# Patient Record
Sex: FEMALE | Race: WHITE | NOT HISPANIC OR LATINO | Employment: FULL TIME | URBAN - METROPOLITAN AREA
[De-identification: names, ages, dates, MRNs, and addresses within clinical notes are randomized per-mention and may not be internally consistent; named-entity substitution may affect disease eponyms.]

---

## 2018-08-16 ENCOUNTER — TRANSCRIBE ORDERS (OUTPATIENT)
Dept: ADMINISTRATIVE | Facility: HOSPITAL | Age: 23
End: 2018-08-16

## 2018-08-16 DIAGNOSIS — N92.1 BREAKTHROUGH BLEEDING ON BIRTH CONTROL PILLS: Primary | ICD-10-CM

## 2018-08-16 PROCEDURE — 87491 CHLMYD TRACH DNA AMP PROBE: CPT | Performed by: OBSTETRICS & GYNECOLOGY

## 2018-08-16 PROCEDURE — G0124 SCREEN C/V THIN LAYER BY MD: HCPCS | Performed by: PATHOLOGY

## 2018-08-16 PROCEDURE — 87591 N.GONORRHOEAE DNA AMP PROB: CPT | Performed by: OBSTETRICS & GYNECOLOGY

## 2018-08-16 PROCEDURE — G0145 SCR C/V CYTO,THINLAYER,RESCR: HCPCS | Performed by: PATHOLOGY

## 2018-08-17 ENCOUNTER — LAB (OUTPATIENT)
Dept: LAB | Facility: HOSPITAL | Age: 23
End: 2018-08-17
Payer: COMMERCIAL

## 2018-08-17 ENCOUNTER — HOSPITAL ENCOUNTER (OUTPATIENT)
Dept: ULTRASOUND IMAGING | Facility: HOSPITAL | Age: 23
Discharge: HOME | End: 2018-08-17
Payer: COMMERCIAL

## 2018-08-17 ENCOUNTER — TELEPHONE (OUTPATIENT)
Dept: GYNECOLOGY | Facility: CLINIC | Age: 23
End: 2018-08-17

## 2018-08-17 DIAGNOSIS — N92.1 BREAKTHROUGH BLEEDING ON BIRTH CONTROL PILLS: ICD-10-CM

## 2018-08-17 LAB
ERYTHROCYTE [DISTWIDTH] IN BLOOD BY AUTOMATED COUNT: 13.5 % (ref 11.6–15.1)
HCT VFR BLD AUTO: 41.6 % (ref 34.8–46.1)
HGB BLD-MCNC: 13.4 G/DL (ref 11.5–15.4)
MCH RBC QN AUTO: 28.9 PG (ref 26.8–34.3)
MCHC RBC AUTO-ENTMCNC: 32.2 G/DL (ref 31.4–37.4)
MCV RBC AUTO: 90 FL (ref 82–98)
PLATELET # BLD AUTO: 262 THOUSANDS/UL (ref 149–390)
PMV BLD AUTO: 10.3 FL (ref 8.9–12.7)
RBC # BLD AUTO: 4.63 MILLION/UL (ref 3.81–5.12)
WBC # BLD AUTO: 6.84 THOUSAND/UL (ref 4.31–10.16)

## 2018-08-17 PROCEDURE — 36415 COLL VENOUS BLD VENIPUNCTURE: CPT

## 2018-08-17 PROCEDURE — 85027 COMPLETE CBC AUTOMATED: CPT

## 2018-08-17 PROCEDURE — 76830 TRANSVAGINAL US NON-OB: CPT

## 2018-08-17 PROCEDURE — 76856 US EXAM PELVIC COMPLETE: CPT

## 2018-08-22 ENCOUNTER — TELEPHONE (OUTPATIENT)
Dept: GYNECOLOGY | Facility: CLINIC | Age: 23
End: 2018-08-22

## 2018-08-22 DIAGNOSIS — N83.201 OVARIAN CYST, RIGHT: Primary | ICD-10-CM

## 2018-08-22 NOTE — PROGRESS NOTES
Pt moms, Sarai Hernandez, aware of results will pass on information to Una Fernandes, Mailed rx to pt for her 6 week follow up u/s

## 2018-08-22 NOTE — TELEPHONE ENCOUNTER
Moms phone number is [phone], Per Twin Eugenio can give her mother the results for her ultrasound and pap smear

## 2018-08-27 ENCOUNTER — CLINICAL SUPPORT (OUTPATIENT)
Dept: FAMILY MEDICINE CLINIC | Facility: CLINIC | Age: 23
End: 2018-08-27
Payer: COMMERCIAL

## 2018-08-27 DIAGNOSIS — Z13.79 GENETIC TESTING: Primary | ICD-10-CM

## 2018-08-27 PROCEDURE — 99211 OFF/OP EST MAY X REQ PHY/QHP: CPT

## 2018-09-05 ENCOUNTER — ANNUAL EXAM (OUTPATIENT)
Dept: GYNECOLOGY | Facility: CLINIC | Age: 23
End: 2018-09-05
Payer: COMMERCIAL

## 2018-09-05 VITALS
RESPIRATION RATE: 15 BRPM | WEIGHT: 127 LBS | SYSTOLIC BLOOD PRESSURE: 100 MMHG | BODY MASS INDEX: 23.37 KG/M2 | HEIGHT: 62 IN | DIASTOLIC BLOOD PRESSURE: 62 MMHG

## 2018-09-05 DIAGNOSIS — N89.8 VAGINAL LESION: Primary | ICD-10-CM

## 2018-09-05 DIAGNOSIS — N76.0 VAGINITIS AND VULVOVAGINITIS: ICD-10-CM

## 2018-09-05 PROCEDURE — 87660 TRICHOMONAS VAGIN DIR PROBE: CPT | Performed by: OBSTETRICS & GYNECOLOGY

## 2018-09-05 PROCEDURE — 87255 GENET VIRUS ISOLATE HSV: CPT | Performed by: OBSTETRICS & GYNECOLOGY

## 2018-09-05 PROCEDURE — 99213 OFFICE O/P EST LOW 20 MIN: CPT | Performed by: OBSTETRICS & GYNECOLOGY

## 2018-09-05 PROCEDURE — 87510 GARDNER VAG DNA DIR PROBE: CPT | Performed by: OBSTETRICS & GYNECOLOGY

## 2018-09-05 PROCEDURE — 87480 CANDIDA DNA DIR PROBE: CPT | Performed by: OBSTETRICS & GYNECOLOGY

## 2018-09-05 RX ORDER — CLINDAMYCIN PHOSPHATE 20 MG/G
CREAM VAGINAL
COMMUNITY
Start: 2018-09-04 | End: 2018-12-27

## 2018-09-05 NOTE — PROGRESS NOTES
Assessment/Plan:    1   Vulvovaginitis -- most likely candida  Will take fluconazole and use monistat for maximum effect  If she develops any pain or ulceration, she will call  Diagnoses and all orders for this visit:    Vaginitis and vulvovaginitis  -     VAGINOSIS DNA PROBE (AFFIRM)    Other orders  -     clindamycin (CLEOCIN) 2 % vaginal cream;           Subjective:      Patient ID: Tr Camera is a 21 y o  female  The patient is a 22 y/o who presents with a 2 day history of vulvar itching and burning  She spent 3 days at the beach, most of the time in a bathing suit  The itching and burning started on the last day  She also has some odor and thought it may be BV, so she used some cleocin vaginal cream last night  It provided no relief  Today she also feels as if there is a "cut" on the inner labia minora  She had intercourse with a new partner this weekend and did use a condom  The following portions of the patient's history were reviewed and updated as appropriate: allergies, current medications, past family history, past medical history, past social history, past surgical history and problem list     Review of Systems   Constitutional: Negative  Respiratory: Negative for shortness of breath  Cardiovascular: Negative for chest pain  Gastrointestinal: Negative  Genitourinary: Positive for vaginal discharge  Skin: Negative  Psychiatric/Behavioral: Negative for agitation, behavioral problems and confusion  Objective:      /62 (Cuff Size: Adult)   Resp 15   Ht 5' 2" (1 575 m)   Wt 57 6 kg (127 lb)   BMI 23 23 kg/m²          Physical Exam   Constitutional: She appears well-developed and well-nourished  No distress  HENT:   Head: Normocephalic  Pulmonary/Chest: Effort normal  No respiratory distress  Genitourinary: No labial fusion  There is no rash, tenderness, lesion or injury on the right labia   There is no rash, tenderness, lesion or injury on the left labia  No erythema, tenderness or bleeding in the vagina  No foreign body in the vagina  No signs of injury around the vagina  Vaginal discharge found  Genitourinary Comments: Inspection of the labia shows mild edema and moderate erythema of the labia min and majora  There is a very superficial laceration of the inner lab min on the left  No ulcers  Vaginal d/c is pink to white, clumpy  The cervix is clean with no cervical motion tenderness  Uterus normal size, retroverted, nontender  The adnexa are nontender and are normal size  Skin: Skin is warm, dry and intact  She is not diaphoretic  No cyanosis  Nails show no clubbing  Psychiatric: She has a normal mood and affect   Her speech is normal and behavior is normal

## 2018-09-07 DIAGNOSIS — B37.3 MONILIAL VAGINITIS: Primary | ICD-10-CM

## 2018-09-07 LAB
CANDIDA RRNA VAG QL PROBE: POSITIVE
G VAGINALIS RRNA GENITAL QL PROBE: NEGATIVE
HSV SPEC CULT: NORMAL
T VAGINALIS RRNA GENITAL QL PROBE: NEGATIVE

## 2018-09-07 RX ORDER — FLUCONAZOLE 150 MG/1
150 TABLET ORAL ONCE
Qty: 1 TABLET | Refills: 3 | Status: SHIPPED | OUTPATIENT
Start: 2018-09-07 | End: 2018-09-07

## 2018-09-12 ENCOUNTER — TELEPHONE (OUTPATIENT)
Dept: GYNECOLOGY | Facility: CLINIC | Age: 23
End: 2018-09-12

## 2018-09-12 NOTE — TELEPHONE ENCOUNTER
[id number] Shonna Lucero, tell her she can just stop  She should expect more of a period a couple of days after she stops  Tell her that her genetic results are in and she should come in to discuss

## 2018-09-13 ENCOUNTER — TELEPHONE (OUTPATIENT)
Dept: GYNECOLOGY | Facility: CLINIC | Age: 23
End: 2018-09-13

## 2018-09-13 ENCOUNTER — ANNUAL EXAM (OUTPATIENT)
Dept: GYNECOLOGY | Facility: CLINIC | Age: 23
End: 2018-09-13
Payer: COMMERCIAL

## 2018-09-13 VITALS
TEMPERATURE: 97.6 F | BODY MASS INDEX: 23.05 KG/M2 | DIASTOLIC BLOOD PRESSURE: 70 MMHG | RESPIRATION RATE: 16 BRPM | WEIGHT: 126 LBS | HEART RATE: 73 BPM | SYSTOLIC BLOOD PRESSURE: 120 MMHG

## 2018-09-13 DIAGNOSIS — B37.3 MONILIAL VAGINITIS: ICD-10-CM

## 2018-09-13 DIAGNOSIS — Z15.09 GENETIC SUSCEPTIBILITY TO CANCER: Primary | ICD-10-CM

## 2018-09-13 PROCEDURE — 99213 OFFICE O/P EST LOW 20 MIN: CPT | Performed by: OBSTETRICS & GYNECOLOGY

## 2018-09-13 RX ORDER — FLUCONAZOLE 150 MG/1
150 TABLET ORAL ONCE
Qty: 1 TABLET | Refills: 3 | Status: SHIPPED | OUTPATIENT
Start: 2018-09-13 | End: 2018-09-13

## 2018-09-13 NOTE — PROGRESS NOTES
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    Monilial vaginitis  -     fluconazole (DIFLUCAN) 150 mg tablet; Take 1 tablet (150 mg total) by mouth once for 1 dose          Subjective:      Patient ID: Jonah Miller is a 21 y o  female  The patient is a 24-year-old with a family history of breast and ovarian cancer  Her mother and her brother have had genetic testing and are positive for the BRCA 2 mutation  The patient had 10 testing about 3 weeks ago and is here for results today  The patient was notified that she is also positive for the BRCA 2 mutation  She was given a copy of the [facility] results for her own records  She is aware that she will need to start mammography is at age 22  Her results also showed that she was at increased risk for melanoma and pancreatic cancer  She has an appointment with a dermatologist already set up  We discussed possible methods of screening for pancreatic cancer, although the best test is yet to be determined  She will look into any potential clinical trials being performed at [facility] or [facility] to help determine the best method of pancreatic screening  Multiple questions were answered  The patient reports that although she has used the Monistat and fluconazole for a yeast infection about 3 weeks ago, she feels that the yeast infection may be coming back  She will get a refill of fluconazole and take this  If she is still not better, she will need to come back for re-culture  The following portions of the patient's history were reviewed and updated as appropriate: allergies, current medications, past family history, past medical history, past social history, past surgical history and problem list     Review of Systems   Constitutional: Negative  Respiratory: Negative for shortness of breath  Cardiovascular: Negative for chest pain  Gastrointestinal: Negative      Genitourinary: Negative  Skin: Negative  Psychiatric/Behavioral: Negative for agitation, behavioral problems and confusion  Objective:      /70 (BP Location: Right arm, Patient Position: Sitting, Cuff Size: Standard)   Pulse 73   Temp 97 6 °F (36 4 °C) (Tympanic)   Resp 16   Wt 57 2 kg (126 lb)   LMP 09/04/2018 (Exact Date)   Breastfeeding? No   BMI 23 05 kg/m²          Physical Exam   Constitutional: She is oriented to person, place, and time  She appears well-developed and well-nourished  No distress  HENT:   Head: Normocephalic and atraumatic  Pulmonary/Chest: Effort normal  No respiratory distress  Neurological: She is alert and oriented to person, place, and time  Skin: Skin is warm and dry  No rash noted  She is not diaphoretic  No erythema  No pallor  Psychiatric: She has a normal mood and affect   Her behavior is normal  Judgment and thought content normal

## 2018-09-13 NOTE — TELEPHONE ENCOUNTER
lmom for pt to pls call the office  Pt needs to reschedule when Liz Patient is here she is having a BRCA testing done

## 2018-10-03 ENCOUNTER — HOSPITAL ENCOUNTER (OUTPATIENT)
Dept: ULTRASOUND IMAGING | Facility: HOSPITAL | Age: 23
Discharge: HOME | End: 2018-10-03
Payer: COMMERCIAL

## 2018-10-03 ENCOUNTER — TELEPHONE (OUTPATIENT)
Dept: GYNECOLOGY | Facility: CLINIC | Age: 23
End: 2018-10-03

## 2018-10-03 DIAGNOSIS — N83.201 OVARIAN CYST, RIGHT: ICD-10-CM

## 2018-10-03 PROCEDURE — 76830 TRANSVAGINAL US NON-OB: CPT

## 2018-10-03 PROCEDURE — 76856 US EXAM PELVIC COMPLETE: CPT

## 2018-12-27 ENCOUNTER — TELEPHONE (OUTPATIENT)
Dept: FAMILY MEDICINE CLINIC | Facility: CLINIC | Age: 23
End: 2018-12-27

## 2018-12-27 DIAGNOSIS — B96.89 BACTERIAL VAGINOSIS: Primary | ICD-10-CM

## 2018-12-27 DIAGNOSIS — N76.0 BACTERIAL VAGINOSIS: Primary | ICD-10-CM

## 2018-12-27 RX ORDER — METRONIDAZOLE 7.5 MG/G
1 GEL VAGINAL
Qty: 70 G | Refills: 0 | Status: SHIPPED | OUTPATIENT
Start: 2018-12-27 | End: 2019-01-01

## 2018-12-27 NOTE — TELEPHONE ENCOUNTER
Patient has symptoms of BD vaginitis  Patient said it was following using a latex condom  Patient believes she has a allergy to latex  She would like to know what you recommend for her  She said she is willing to come in but her schedule is tough   Please advise

## 2018-12-31 ENCOUNTER — APPOINTMENT (OUTPATIENT)
Dept: LAB | Age: 23
End: 2018-12-31
Payer: COMMERCIAL

## 2018-12-31 ENCOUNTER — TRANSCRIBE ORDERS (OUTPATIENT)
Dept: ADMINISTRATIVE | Age: 23
End: 2018-12-31

## 2018-12-31 DIAGNOSIS — L27.2 DERMATITIS DUE TO FOOD TAKEN INTERNALLY: Primary | ICD-10-CM

## 2018-12-31 DIAGNOSIS — L27.2 DERMATITIS DUE TO FOOD TAKEN INTERNALLY: ICD-10-CM

## 2018-12-31 PROCEDURE — 36415 COLL VENOUS BLD VENIPUNCTURE: CPT

## 2018-12-31 PROCEDURE — 83516 IMMUNOASSAY NONANTIBODY: CPT

## 2018-12-31 PROCEDURE — 86255 FLUORESCENT ANTIBODY SCREEN: CPT

## 2018-12-31 PROCEDURE — 82784 ASSAY IGA/IGD/IGG/IGM EACH: CPT

## 2019-01-02 LAB
ENDOMYSIUM IGA SER QL: NEGATIVE
GLIADIN PEPTIDE IGA SER-ACNC: 4 UNITS (ref 0–19)
GLIADIN PEPTIDE IGG SER-ACNC: 3 UNITS (ref 0–19)
IGA SERPL-MCNC: 180 MG/DL (ref 87–352)
TTG IGA SER-ACNC: <2 U/ML (ref 0–3)
TTG IGG SER-ACNC: <2 U/ML (ref 0–5)

## 2019-03-21 PROBLEM — L24.7 IRRITANT CONTACT DERMATITIS DUE TO PLANT: Status: ACTIVE | Noted: 2019-03-21

## 2019-03-23 PROBLEM — H10.13 ALLERGIC CONJUNCTIVITIS OF BOTH EYES: Status: ACTIVE | Noted: 2019-03-23

## 2019-03-23 PROBLEM — T78.3XXA ANGIO-EDEMA: Status: ACTIVE | Noted: 2019-03-23

## 2019-03-23 PROBLEM — J30.81 ALLERGIC RHINITIS DUE TO ANIMAL (CAT) (DOG) HAIR AND DANDER: Status: ACTIVE | Noted: 2019-03-23

## 2019-03-23 PROBLEM — J45.20 ALLERGIC ASTHMA, MILD INTERMITTENT, UNCOMPLICATED: Status: ACTIVE | Noted: 2019-03-23

## 2019-03-23 PROBLEM — T78.05XA ANAPHYLAXIS DUE TO TREE NUTS OR SEEDS: Status: ACTIVE | Noted: 2019-03-23

## 2019-03-23 PROBLEM — T78.1XXA PFAS (POLLEN-FOOD ALLERGY SYNDROME), INITIAL ENCOUNTER: Status: ACTIVE | Noted: 2019-03-23

## 2019-03-23 PROBLEM — L20.84 INTRINSIC ECZEMA: Status: ACTIVE | Noted: 2019-03-23

## 2019-03-23 PROBLEM — J30.89 ALLERGIC RHINITIS CAUSED BY MOLD: Status: ACTIVE | Noted: 2019-03-23

## 2019-03-23 PROBLEM — L24.7 IRRITANT CONTACT DERMATITIS DUE TO PLANTS, EXCEPT FOOD: Status: ACTIVE | Noted: 2019-03-23

## 2019-03-23 PROBLEM — L25.3 LATEX ALLERGY, CONTACT DERMATITIS: Status: ACTIVE | Noted: 2019-03-23

## 2019-03-23 PROBLEM — J30.1 SEASONAL ALLERGIC RHINITIS DUE TO POLLEN: Status: ACTIVE | Noted: 2019-03-23

## 2019-08-28 ENCOUNTER — ANNUAL EXAM (OUTPATIENT)
Dept: GYNECOLOGY | Facility: CLINIC | Age: 24
End: 2019-08-28
Payer: COMMERCIAL

## 2019-08-28 VITALS
RESPIRATION RATE: 17 BRPM | SYSTOLIC BLOOD PRESSURE: 102 MMHG | BODY MASS INDEX: 24.4 KG/M2 | HEART RATE: 79 BPM | WEIGHT: 132.6 LBS | HEIGHT: 62 IN | DIASTOLIC BLOOD PRESSURE: 60 MMHG

## 2019-08-28 DIAGNOSIS — Z11.3 ENCOUNTER FOR SCREENING FOR INFECTIONS WITH A PREDOMINANTLY SEXUAL MODE OF TRANSMISSION: ICD-10-CM

## 2019-08-28 DIAGNOSIS — Z01.419 ENCOUNTER FOR GYNECOLOGICAL EXAMINATION (GENERAL) (ROUTINE) WITHOUT ABNORMAL FINDINGS: Primary | ICD-10-CM

## 2019-08-28 DIAGNOSIS — Z15.09 GENETIC SUSCEPTIBILITY TO CANCER: ICD-10-CM

## 2019-08-28 DIAGNOSIS — R10.2 PELVIC PAIN: ICD-10-CM

## 2019-08-28 PROCEDURE — 87491 CHLMYD TRACH DNA AMP PROBE: CPT | Performed by: OBSTETRICS & GYNECOLOGY

## 2019-08-28 PROCEDURE — S0612 ANNUAL GYNECOLOGICAL EXAMINA: HCPCS | Performed by: OBSTETRICS & GYNECOLOGY

## 2019-08-28 PROCEDURE — 87591 N.GONORRHOEAE DNA AMP PROB: CPT | Performed by: OBSTETRICS & GYNECOLOGY

## 2019-08-28 NOTE — PROGRESS NOTES
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  Comments:  BRCA 2 positive  Orders:  -     US pelvis complete w transvaginal; Future          Subjective:      Patient ID: Keyana Cote is a 25 y o  female  The patient is a 26-year-old who presents for an annual gyn exam   She reports that her menses are regular and last for 5 days  She has 2 heavier days when she also experiences cramping  They they are no heavier than they were in the past on birth control pills  She stopped oral contraceptives because of breakthrough bleeding  She is not interested in restarting  She states that her migraines have stopped since she is off the oral contraceptives  The patient denies any breast or bladder problems  She has no dyspareunia  Her boyfriend lives in [place] temporarily  They do not see each other often, but she uses condoms consistently with him  She has no other partners  The patient is positive for BRCA 2  Both her brother and her mother are also positive  She is aware that she will need to have mammography starting next year  She has thought about prophylactic bilateral mastectomy and she wishes to proceed with this surgery  She is being referred to breast surgeons to discuss this in more detail with them  The patient had a normal Pap smear last year  Her GC and chlamydia were negative  This year she will have only the GC and chlamydia testing  The patient reports that she has had a GI workup this year because of frequent problems with abdominal and pelvic pain  She also feels very bloated  Because of her BRCA 2 positivity combined with the bloating and pain, she will have an ultrasound of the pelvis    The patient had her pancreas evaluated by the GI doctor, who was aware of her be BRCA status  The following portions of the patient's history were reviewed and updated as appropriate: allergies, current medications, past family history, past medical history, past social history, past surgical history and problem list     Review of Systems   Constitutional: Negative  Respiratory: Negative for shortness of breath  Cardiovascular: Negative for chest pain  Gastrointestinal: Positive for abdominal distention and abdominal pain  Genitourinary: Positive for pelvic pain  Negative for difficulty urinating, dyspareunia, frequency, genital sores, menstrual problem, vaginal discharge and vaginal pain  Skin: Negative  Psychiatric/Behavioral: Negative for agitation, behavioral problems and confusion  Objective:      /60 (BP Location: Left arm, Patient Position: Sitting, Cuff Size: Standard)   Pulse 79   Resp 17   Ht 5' 2" (1 575 m)   Wt 60 1 kg (132 lb 9 6 oz)   LMP 08/07/2019 (Exact Date)   BMI 24 25 kg/m²          Physical Exam   Constitutional: She appears well-developed and well-nourished  No distress  HENT:   Head: Normocephalic  Pulmonary/Chest: Effort normal  No respiratory distress  Right breast exhibits no inverted nipple, no mass, no nipple discharge, no skin change and no tenderness  Left breast exhibits no inverted nipple, no mass, no nipple discharge, no skin change and no tenderness  Breasts are symmetrical    Abdominal: She exhibits no distension and no mass  There is no tenderness  There is no rebound and no guarding  Genitourinary: Uterus normal  No labial fusion  There is no rash, tenderness, lesion or injury on the right labia  There is no rash, tenderness, lesion or injury on the left labia  Uterus is not tender  Cervix exhibits no motion tenderness, no discharge and no friability  Right adnexum displays no mass, no tenderness and no fullness  Left adnexum displays no mass, no tenderness and no fullness   No erythema, tenderness or bleeding in the vagina  No foreign body in the vagina  No signs of injury around the vagina  No vaginal discharge found  Lymphadenopathy:        Right axillary: No pectoral and no lateral adenopathy present  Left axillary: No pectoral and no lateral adenopathy present  Skin: Skin is warm, dry and intact  She is not diaphoretic  No cyanosis  Nails show no clubbing  Psychiatric: She has a normal mood and affect   Her speech is normal and behavior is normal

## 2019-08-29 LAB
C TRACH DNA SPEC QL NAA+PROBE: NEGATIVE
N GONORRHOEA DNA SPEC QL NAA+PROBE: NEGATIVE

## 2019-09-11 ENCOUNTER — TELEPHONE (OUTPATIENT)
Dept: FAMILY MEDICINE CLINIC | Facility: CLINIC | Age: 24
End: 2019-09-11

## 2019-09-11 NOTE — TELEPHONE ENCOUNTER
----- Message from Charlotte Pedraza MD sent at 9/4/2019  3:42 PM EDT -----  Notify patient that her GC and Chlamydia are negative

## 2019-09-20 ENCOUNTER — TELEPHONE (OUTPATIENT)
Dept: HEMATOLOGY ONCOLOGY | Facility: CLINIC | Age: 24
End: 2019-09-20

## 2019-09-20 NOTE — TELEPHONE ENCOUNTER
New Patient Encounter    New Patient Intake Form   Patient Details:  Kary Arcos  1995  [id number]    Background Information:  [address] starts by opening a telephone encounter and gathering the following information   Who is calling to schedule? If not self, relationship to patient? self   Referring Provider Dr Gautam Hayes   What is the diagnosis? BRCA +   When was the diagnosis? 08/27/2018   Is patient aware of diagnosis? Yes   Reason for visit? NP DX   Have you had any testing done? If so: when, where? Yes   Are records in [facility]? yes   Was the patient told to bring a disk? no   Scheduling Information:   Preferred [place]: Jacek Estrada     Requesting Specific Provider? sabrina   Are there any dates/time the patient cannot be seen? no   Counseling Pre-Screen:  If the patient answers YES to any of the below questions, please route to the appropriate location specific counselor    Have you felt anxious or worried about cancer and the treatment you are receiving? No   Has your diagnosis caused physical, emotional, or financial hardship for you? No   Note: Do not ask the patient about transportation issues/needs  Please notate if the patient brings it up and the counselor will schedule accordingly  Miscellaneous: na   After completing the above information, please route to Financial Counselor and the appropriate Nurse Navigator for review

## 2019-09-23 ENCOUNTER — HOSPITAL ENCOUNTER (OUTPATIENT)
Dept: ULTRASOUND IMAGING | Facility: HOSPITAL | Age: 24
Discharge: HOME | End: 2019-09-23
Payer: COMMERCIAL

## 2019-09-23 DIAGNOSIS — R10.2 PELVIC PAIN: ICD-10-CM

## 2019-09-23 DIAGNOSIS — Z15.09 GENETIC SUSCEPTIBILITY TO CANCER: ICD-10-CM

## 2019-09-23 PROCEDURE — 76856 US EXAM PELVIC COMPLETE: CPT

## 2019-09-23 PROCEDURE — 76830 TRANSVAGINAL US NON-OB: CPT

## 2019-09-26 ENCOUNTER — TELEPHONE (OUTPATIENT)
Dept: GYNECOLOGY | Facility: CLINIC | Age: 24
End: 2019-09-26

## 2019-09-26 DIAGNOSIS — N83.202 OVARIAN CYST, LEFT: Primary | ICD-10-CM

## 2019-09-26 NOTE — TELEPHONE ENCOUNTER
Radiology called and left a message stating that there was significant findings on the pelvic ultrasound the patient recently had done

## 2019-09-27 NOTE — TELEPHONE ENCOUNTER
Called patient with results of u/s pelvis  She will schedule a repeat u/s in 6 weeks for resolution  She reports that she feels premenstrual now  This would go along with the finding of a possible corpus luteum on u/s

## 2019-09-30 PROBLEM — Z15.09 BRCA2 GENE MUTATION POSITIVE: Status: ACTIVE | Noted: 2019-09-30

## 2019-09-30 PROBLEM — Z15.01 BRCA2 GENE MUTATION POSITIVE: Status: ACTIVE | Noted: 2019-09-30

## 2019-10-02 ENCOUNTER — CONSULT (OUTPATIENT)
Dept: SURGICAL ONCOLOGY | Facility: CLINIC | Age: 24
End: 2019-10-02
Payer: COMMERCIAL

## 2019-10-02 VITALS
WEIGHT: 132 LBS | DIASTOLIC BLOOD PRESSURE: 64 MMHG | TEMPERATURE: 98.5 F | BODY MASS INDEX: 24.29 KG/M2 | HEIGHT: 62 IN | HEART RATE: 64 BPM | SYSTOLIC BLOOD PRESSURE: 114 MMHG | RESPIRATION RATE: 16 BRPM

## 2019-10-02 DIAGNOSIS — Z84.81 FAMILY HISTORY OF BRCA GENE POSITIVE: ICD-10-CM

## 2019-10-02 DIAGNOSIS — Z15.01 BRCA2 GENE MUTATION POSITIVE: Primary | ICD-10-CM

## 2019-10-02 DIAGNOSIS — Z15.09 BRCA2 GENE MUTATION POSITIVE: Primary | ICD-10-CM

## 2019-10-02 PROCEDURE — 99243 OFF/OP CNSLTJ NEW/EST LOW 30: CPT | Performed by: SURGERY

## 2019-10-02 RX ORDER — HYOSCYAMINE SULFATE 0.125 MG
0.12 TABLET ORAL EVERY 4 HOURS PRN
COMMUNITY
End: 2020-01-14

## 2019-10-02 NOTE — PROGRESS NOTES
Surgical Oncology Consult Note       [address] [facility],[address]  [facility]  [address] [facility] [address]    Cristina Davidjuan ramon  1995  [id number]  [address]  [facility]  [address] [address]      Chief Complaint:     Chief Complaint   Patient presents with    Consult     BRCA 2 positive       Assessment and Plan:   Assessment/Plan     Patient presents with the a known/ documented BRCA 2 mutation  She is interested in bilateral prophylactic surgery  She has discussed this with her family as well as colleagues who have undergone the surgery  Literature looking at the expected benefit a preventative mastectomy on cancer incidence and mortality and BRCA mutations has been studied in improved  Outcomes continue to increased down to age 22 (Breast Cancer Res Treat 2018; 270:839-781)  She has no evidence of malignancy on clinical examination today  We had a long conversation regarding the various types of surgeries  I have recommended she see a plastic surgeon as a consult  I will see her back in 3 months  I would recommend a breast MRI prior to surgery  All questions were answered the patient's satisfaction  We also showed her pictures of nipple tattoos which she was interested in  Oncology History:      No history exists  History of Present Illness: The patient's paternal grandmother was diagnosed with ovarian cancer at the age of 48  Her mother had a bilateral prophylactic mastectomy and has done well  The patient has been tested in has a deleterious mutation of BRCA 2  She presents now for an opinion regarding further management  She is discussed this at length with her gynecologist who was following her from an ovarian perspective  Review of Systems:   Review of Systems   All other systems reviewed and are negative        Past Medical History:      Patient Active Problem List   Diagnosis    Irritant contact dermatitis due to plant    Irritant contact dermatitis due to plants, except food    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    Angio-edema    Allergic asthma, mild intermittent, uncomplicated    Allergic rhinitis caused by mold    Seasonal allergic rhinitis due to pollen    Allergic rhinitis due to animal (cat) (dog) hair and dander    Allergic conjunctivitis of both eyes    Intrinsic eczema    PFAS (pollen-food allergy syndrome), initial encounter    Latex allergy, contact dermatitis    BRCA2 gene mutation positive        Past Medical History:   Diagnosis Date    Anaphylaxis     tree nuts     BRCA positive     Cervical stenosis     Cervical stenosis (uterine cervix)     Dysmenorrhea     Hives     Inguinal mass     right    Menorrhagia     Pap smear for cervical cancer screening 05/31/2017    ASCUS, HPV negative    Vitamin D deficiency         Past Surgical History:   Procedure Laterality Date    KNEE CARTILAGE SURGERY      LYMPHADENECTOMY Bilateral     MASS EXCISION Right     inguinal, possible folliculitis    WISDOM TOOTH EXTRACTION Bilateral         Family History   Problem Relation Age of Onset    Ovarian cancer Maternal Grandmother 48    Brain cancer Maternal Grandmother     BRCA 1/2 Mother         Prophylactic surgery    Hypertension Father     Heart disease Father     BRCA 1/2 Brother     Allergies Brother     BRCA 1/2 Maternal Uncle         Social History     Socioeconomic History    Marital status: Single     Spouse name: Not on file    Number of children: 0    Years of education: Not on file    Highest education level: Not on file   Occupational History    Occupation: Sales      Comment: [employer]    Social Needs    Financial resource strain: Not on file    Food insecurity:     Worry: Not on file     Inability: Not on file    Transportation needs:     Medical: Not on file     Non-medical: Not on file   Tobacco Use    Smoking status: Never Smoker    Smokeless tobacco: Never Used   Substance and Sexual Activity    Alcohol use:  Yes     Alcohol/week: 4 0 standard drinks     Types: 4 Standard drinks or equivalent per week    Drug use: No    Sexual activity: Yes   Lifestyle    Physical activity:     Days per week: 7 days     Minutes per session: 120 min    Stress: Not on file   Relationships    Social connections:     Talks on phone: Not on file     Gets together: Not on file     Attends Orthodox service: Not on file     Active member of club or organization: Not on file     Attends meetings of clubs or organizations: Not on file     Relationship status: Not on file    Intimate partner violence:     Fear of current or ex partner: Not on file     Emotionally abused: Not on file     Physically abused: Not on file     Forced sexual activity: Not on file   Other Topics Concern    Not on file   Social History Narrative    Patient lives in a single dwelling home     [place] [employer] genes[employer] in the bedroom     Home is smoke free     Uses air      No humidifier     No dehumidifier     No basement     Window air conditioning         2 dogs  Dilly and Morales and there not allowed in the bedroom         Caffeine:  Does not consume     Chocolate occasional         Patient lives with mom and dad         Current Outpatient Medications:     albuterol (VENTOLIN HFA) 90 mcg/act inhaler, Inhale 2 puffs every 6 (six) hours as needed for wheezing, Disp: 18 g, Rfl: 3    EPINEPHrine (EPIPEN) 0 3 mg/0 3 mL SOAJ, Inject 0 3 mg into a muscle as needed for anaphylaxis, Disp: , Rfl: 1    hyoscyamine (ANASPAZ,LEVSIN) 0 125 MG tablet, Take 0 125 mg by mouth every 4 (four) hours as needed for cramping, Disp: , Rfl:     Wheat Dextrin (BENEFIBER PO), Take by mouth daily, Disp: , Rfl:     EPINEPHrine (EPIPEN) 0 3 mg/0 3 mL SOAJ, Inject 0 3 mL (0 3 mg total) into a muscle once for 1 dose, Disp: 1 each, Rfl: 11    mometasone (ELOCON) 0 1 % cream, Apply topically daily for 90 days, Disp: 45 g, Rfl: 1     Allergies   Allergen Reactions    Apple Anaphylaxis    Cherry Anaphylaxis    Nuts Anaphylaxis    Peach [Prunus Persica] Anaphylaxis    Latex Hives and Swelling    Nsaids Rash    Tolmetin Rash       Physical Exam:     Vitals:    10/02/19 0840   BP: 114/64   Pulse: 64   Resp: 16   Temp: 98 5 °F (36 9 °C)     Physical Exam   Pulmonary/Chest:     Both breasts were examined in the sitting and supine position  There are no worrisome skin lesions, no nipple retraction and no nipple discharge  There are no dominant masses, axillary adenopathy or supraclavicular adenopathy on either side  Results:     I reviewed her genetic results from area there under media in the chart  Discussion/Summary:     I had a long conversation with the patient reviewing the pros and cons as well as the recent publication looking at benefit by age at mastectomy  All questions were answered the patient's satisfaction  We will see her back in approximately 3 months after her visit with plastic surgery  I spent over 40 minutes reviewing the patient's records and previous notes as well as face-to-face discussion reviewing surgical options pros and cons of prophylactic surgery literature supporting early prophylactic surgery as well as pictures and diagrams  Greater than 50% of this was sent if face-to-face time  Advance Care Planning/Advance Directives:  I discussed the disease status, treatment plans and follow-up with the patient

## 2019-10-19 PROBLEM — L21.0 SEBORRHEA CAPITIS: Status: ACTIVE | Noted: 2019-10-19

## 2019-10-30 ENCOUNTER — OFFICE VISIT (OUTPATIENT)
Dept: SURGICAL ONCOLOGY | Facility: CLINIC | Age: 24
End: 2019-10-30
Payer: COMMERCIAL

## 2019-10-30 VITALS
HEART RATE: 72 BPM | TEMPERATURE: 98.6 F | DIASTOLIC BLOOD PRESSURE: 70 MMHG | HEIGHT: 62 IN | SYSTOLIC BLOOD PRESSURE: 114 MMHG | RESPIRATION RATE: 16 BRPM | WEIGHT: 136 LBS | BODY MASS INDEX: 25.03 KG/M2

## 2019-10-30 DIAGNOSIS — Z15.01 BRCA2 GENE MUTATION POSITIVE: Primary | ICD-10-CM

## 2019-10-30 DIAGNOSIS — Z15.09 BRCA2 GENE MUTATION POSITIVE: Primary | ICD-10-CM

## 2019-10-30 PROCEDURE — 99213 OFFICE O/P EST LOW 20 MIN: CPT | Performed by: SURGERY

## 2019-10-30 NOTE — PROGRESS NOTES
Surgical Oncology Follow Up       [address] [place] [address],[address]  [facility] [place]  [address] [facility] [address]  [place] [address]    Arnel Villagran  1995  [id number]  [address] [facility] [facility]  [facility] [place]  [address] [facility] [address]    Chief Complaint   Patient presents with    Follow-up          Assessment & Plan:   The patient is interested in moving upper Plastic surgery for her BRCA-2 mutation  She has met with Dr Erlin Boyer and is planned to undergo a bilateral mastectomy in the early part of next year  I have recommended an MRI, prior to that surgery  The patient is interested in various options regarding the nipple whether it should be nipple sparing, reconstructed nipple or nipple tattooing  She will review this with Dr Erlin Boyer   She prefers not to have lateral incision lines extending from the nipple which might be able to be significantly reduced  I will discuss this with Dr Erlin Boyer   We will see her back in the early part of the new year to coordinate her surgery  The patient has a follow-up appointment next week with Dr Erlin Boyer     Cancer History:      No history exists  Interval History:   See Assessment & Plan    Review of Systems:   Review of Systems   All other systems reviewed and are negative        Past Medical History     Patient Active Problem List   Diagnosis    Irritant contact dermatitis due to plant    Irritant contact dermatitis due to plants, except food    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    Angio-edema    Allergic asthma, mild intermittent, uncomplicated    Allergic rhinitis caused by mold    Seasonal allergic rhinitis due to pollen    Allergic rhinitis due to animal (cat) (dog) hair and dander    Acute atopic conjunctivitis, bilateral    Intrinsic eczema    PFAS (pollen-food allergy syndrome), initial encounter    Latex allergy, contact dermatitis    BRCA2 gene mutation positive    Exercise-induced asthma    Seborrhea capitis     Past Medical History:   Diagnosis Date    Anaphylaxis     tree nuts     BRCA positive     Cervical stenosis     Cervical stenosis (uterine cervix)     Dysmenorrhea     Hives     Inguinal mass     right    Irritable bowel     Menorrhagia     Pap smear for cervical cancer screening 05/31/2017    ASCUS, HPV negative    Vitamin D deficiency      Past Surgical History:   Procedure Laterality Date    KNEE CARTILAGE SURGERY      LYMPHADENECTOMY Bilateral     MASS EXCISION Right     inguinal, possible folliculitis    WISDOM TOOTH EXTRACTION Bilateral      Family History   Problem Relation Age of Onset    Ovarian cancer Maternal Grandmother 48    Brain cancer Maternal Grandmother     BRCA 1/2 Mother         Prophylactic surgery    Hypertension Father     Heart disease Father     BRCA 1/2 Brother     Allergies Brother     BRCA 1/2 Maternal Uncle      Social History     Socioeconomic History    Marital status: Single     Spouse name: Not on file    Number of children: 0    Years of education: Not on file    Highest education level: Not on file   Occupational History    Occupation: Sales      Comment: [employer]    Social Needs    Financial resource strain: Not on file    Food insecurity:     Worry: Not on file     Inability: Not on file    Transportation needs:     Medical: Not on file     Non-medical: Not on file   Tobacco Use    Smoking status: Never Smoker    Smokeless tobacco: Never Used   Substance and Sexual Activity    Alcohol use:  Yes     Alcohol/week: 4 0 standard drinks     Types: 4 Standard drinks or equivalent per week    Drug use: No    Sexual activity: Yes   Lifestyle    Physical activity:     Days per week: 7 days     Minutes per session: 120 min    Stress: Not on file   Relationships    Social connections:     Talks on phone: Not on file     Gets together: Not on file     Attends Gnosticism service: Not on file     Active member of club or organization: Not on file     Attends meetings of clubs or organizations: Not on file     Relationship status: Not on file    Intimate partner violence:     Fear of current or ex partner: Not on file     Emotionally abused: Not on file     Physically abused: Not on file     Forced sexual activity: Not on file   Other Topics Concern    Not on file   Social History Narrative    Patient lives in a single dwelling home     [place] genesis in the bedroom     Home is smoke free     Uses air      No humidifier     No dehumidifier     No basement     Window air conditioning         2 dogs  Dilly and Dominik Cowan and there not allowed in the bedroom         Caffeine:  Does not consume     Chocolate occasional         Patient lives with mom and dad        Current Outpatient Medications:     albuterol (VENTOLIN HFA) 90 mcg/act inhaler, Inhale 2 puffs every 6 (six) hours as needed for wheezing, Disp: 18 g, Rfl: 3    Ciclopirox 1 % shampoo, Apply 1 Dose topically daily, Disp: 120 mL, Rfl: 3    EPINEPHrine (EPIPEN) 0 3 mg/0 3 mL SOAJ, Inject 0 3 mg into a muscle as needed for anaphylaxis, Disp: , Rfl: 1    hyoscyamine (ANASPAZ,LEVSIN) 0 125 MG tablet, Take 0 125 mg by mouth every 4 (four) hours as needed for cramping, Disp: , Rfl:     saccharomyces boulardii (FLORASTOR) 250 mg capsule, Take 250 mg by mouth 2 (two) times a day, Disp: , Rfl:     Wheat Dextrin (BENEFIBER PO), Take by mouth daily, Disp: , Rfl:     mometasone (ELOCON) 0 1 % cream, Apply topically daily for 90 days, Disp: 45 g, Rfl: 1  Allergies   Allergen Reactions    Apple Anaphylaxis    Cherry Anaphylaxis    Nuts Anaphylaxis    Peach [Prunus Persica] Anaphylaxis    Strawberry Extract Anaphylaxis    Latex Hives and Swelling    Nsaids Rash    Tolmetin Rash       Physical Exam:     Vitals:    10/30/19 1050   BP: 114/70   Pulse: 72   Resp: 16   Temp: 98 6 °F (37 °C)     Physical Exam   Pulmonary/Chest:     Both breasts were examined in the sitting and supine position  There are no worrisome skin lesions, no nipple retraction and no nipple discharge  There are no dominant masses, axillary adenopathy or supraclavicular adenopathy on either side  Results & Discussion:   The patient had a long discussion regarding the nipple reconstruction talk about different ways to do nipple sparing surgery in incisions, reconstruction of the nipple as well as nipple tattooing  We showed her diagrams and pictures  She is still concerned about the lateral incisions which did track from the appearance of the breast   She will discuss this further with Dr Noelle Toledo           Advance Care Planning/Advance Directives:  I discussed the disease status, treatment plans and follow-up with the patient

## 2019-11-06 ENCOUNTER — HOSPITAL ENCOUNTER (OUTPATIENT)
Dept: ULTRASOUND IMAGING | Facility: HOSPITAL | Age: 24
Discharge: HOME | End: 2019-11-06
Payer: COMMERCIAL

## 2019-11-06 DIAGNOSIS — N83.202 OVARIAN CYST, LEFT: ICD-10-CM

## 2019-11-06 PROCEDURE — 76830 TRANSVAGINAL US NON-OB: CPT

## 2019-11-06 PROCEDURE — 76856 US EXAM PELVIC COMPLETE: CPT

## 2019-11-09 ENCOUNTER — TELEPHONE (OUTPATIENT)
Dept: GYNECOLOGY | Facility: CLINIC | Age: 24
End: 2019-11-09

## 2019-11-09 NOTE — TELEPHONE ENCOUNTER
Spoke to pt on the phone and she would like for us to look into the saline infusion with dr Yanni Espinoza  We will take care of this for her

## 2019-11-09 NOTE — TELEPHONE ENCOUNTER
----- Message from Yenny Esposito MD sent at 11/8/2019  7:07 PM EST -----  Notify pt that her u/s of the pelvis shows that the previously seen ovarian cyst has resolved completely  There is a suggestion of a mild indentation of the upper part of the uterus  This has not been seen on any of her previous u/s  This would not be an issue until she starts thinking about pregnancy  She could have a saline infusion sonogram to clarify whether or not there is really a separation  Call Dr Yocasta Mark office to see if he does these  An alternative would be hysteroscopy in the office

## 2019-11-11 NOTE — TELEPHONE ENCOUNTER
Pt given Dr Lilia Irving name and office information  Pt states she will call them today to set up this appt

## 2019-11-27 ENCOUNTER — OFFICE VISIT (OUTPATIENT)
Dept: GYNECOLOGY | Facility: CLINIC | Age: 24
End: 2019-11-27
Payer: COMMERCIAL

## 2019-11-27 ENCOUNTER — ULTRASOUND (OUTPATIENT)
Dept: GYNECOLOGY | Facility: CLINIC | Age: 24
End: 2019-11-27
Payer: COMMERCIAL

## 2019-11-27 DIAGNOSIS — Z15.01 BRCA GENE MUTATION POSITIVE IN FEMALE: ICD-10-CM

## 2019-11-27 DIAGNOSIS — Q51.22 SUBSEPTATE UTERUS: Primary | ICD-10-CM

## 2019-11-27 DIAGNOSIS — Z15.09 BRCA GENE MUTATION POSITIVE IN FEMALE: ICD-10-CM

## 2019-11-27 DIAGNOSIS — Z15.02 BRCA GENE MUTATION POSITIVE IN FEMALE: ICD-10-CM

## 2019-11-27 DIAGNOSIS — N83.209 CYST OF OVARY, UNSPECIFIED LATERALITY: ICD-10-CM

## 2019-11-27 DIAGNOSIS — Z15.09 BRCA2 GENE MUTATION POSITIVE: Primary | ICD-10-CM

## 2019-11-27 DIAGNOSIS — Z15.01 BRCA2 GENE MUTATION POSITIVE: Primary | ICD-10-CM

## 2019-11-27 PROCEDURE — 99202 OFFICE O/P NEW SF 15 MIN: CPT | Performed by: OBSTETRICS & GYNECOLOGY

## 2019-11-27 PROCEDURE — 76830 TRANSVAGINAL US NON-OB: CPT | Performed by: OBSTETRICS & GYNECOLOGY

## 2019-11-27 NOTE — PROGRESS NOTES
Patient referred to the office by Dr Abel Dent secondary to possible uterine anomaly  The patient is brca 2  positive  She is actually scheduled for a bilateral mastectomy in January  She does have ultrasounds of her pelvis every 6 months  She had an ultrasound done in September which revealed a 1 4 cm likely corpus luteum cyst   She had a follow-up ultrasound on November the 6 which showed complete resolution of the ovarian cyst however there was a question as to whether patient had an arcuate or a septated uterus  She presents to the office today for transvaginal scan    Post-procedure Diagnoses   1  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]   2  BRCA gene mutation positive in female [Z15 01, Z15 02, Z15 09]     Show:Clear all  [x]Manual[x]Template[]Copied    Added by:  [x]Guadalupe Morgan    []Alex for details  AMB US Pelvic Non OB  Date/Time: 11/27/2019 9:09 AM  Performed by: Travis Jefferson  Authorized by: Dre Frederick DO      Procedure details:     Technique:  Transvaginal US, Non-OB    Position: lithotomy exam    Uterine findings:     Length (cm): 6 48    Height (cm):  3 96    Width (cm):  5 33    Endometrial stripe: identified      Endometrium thickness (mm):  3 8  Left ovary findings:     Left ovary:  Visualized    Length (cm): 3 37    Height (cm): 1 41    Width (cm): 2 05  Right ovary findings:     Right ovary:  Visualized    Length (cm): 2 59    Height (cm): 1 29    Width (cm): 1 43  Other findings:     Free pelvic fluid: not identified      Free peritoneal fluid: not identified    Post-Procedure Details:     Impression:  Anteverted uterus and bilateral ovaries appear within normal limits  Previous ultrasound demonstrated a possible subseptate/arcuate uterus, which would be very slightly minimal based off today's images  No free fluid  The left ovary contains a 1cm follicle  Tolerance:   Tolerated well, no immediate complications    Complications: no complications          Reviewed ultrasound findings with the patient  On our ultrasound see no evidence of a septated or arcuate uterus  No further evaluation needed  Patient is aware of the brac status and that once she has completed childbearing her ovaries should be removed    She will return to the office in 6 months for repeat ultrasound

## 2019-11-27 NOTE — PROGRESS NOTES
AMB US Pelvic Non OB  Date/Time: 11/27/2019 9:09 AM  Performed by: Harpal Vigil  Authorized by: Jd Johansen DO     Procedure details:     Technique:  Transvaginal US, Non-OB    Position: lithotomy exam    Uterine findings:     Length (cm): 6 48    Height (cm):  3 96    Width (cm):  5 33    Endometrial stripe: identified      Endometrium thickness (mm):  3 8  Left ovary findings:     Left ovary:  Visualized    Length (cm): 3 37    Height (cm): 1 41    Width (cm): 2 05  Right ovary findings:     Right ovary:  Visualized    Length (cm): 2 59    Height (cm): 1 29    Width (cm): 1 43  Other findings:     Free pelvic fluid: not identified      Free peritoneal fluid: not identified    Post-Procedure Details:     Impression:  Anteverted uterus and bilateral ovaries appear within normal limits  Previous ultrasound demonstrated a possible subseptate/arcuate uterus, which would be very slightly minimal based off today's images  No free fluid  The left ovary contains a 1cm follicle  Tolerance: Tolerated well, no immediate complications    Complications: no complications    Additional Procedure Comments:      [employer] P5 transvaginal transducer E8C with Serial Number [id number] was used during procedure and subsequently cleaned with high level disinfection utilizing the [employer]       Ultrasound performed at:     [facility] for [address]  [address], [address]   Phone: [phone]  Fax:  [phone]

## 2019-12-10 ENCOUNTER — HOSPITAL ENCOUNTER (OUTPATIENT)
Dept: MRI IMAGING | Facility: HOSPITAL | Age: 24
Discharge: HOME | End: 2019-12-10
Attending: SURGERY
Payer: COMMERCIAL

## 2019-12-10 DIAGNOSIS — Z15.01 BRCA2 GENE MUTATION POSITIVE: ICD-10-CM

## 2019-12-10 DIAGNOSIS — Z15.09 BRCA2 GENE MUTATION POSITIVE: ICD-10-CM

## 2019-12-10 PROCEDURE — C8908 MRI W/O FOL W/CONT, BREAST,: HCPCS

## 2019-12-10 PROCEDURE — C8937 CAD BREAST MRI: HCPCS

## 2019-12-10 PROCEDURE — A9585 GADOBUTROL INJECTION: HCPCS | Performed by: SURGERY

## 2019-12-10 RX ADMIN — GADOBUTROL 6 ML: 604.72 INJECTION INTRAVENOUS at 12:30

## 2019-12-11 ENCOUNTER — TELEPHONE (OUTPATIENT)
Dept: SURGICAL ONCOLOGY | Facility: CLINIC | Age: 24
End: 2019-12-11

## 2019-12-11 NOTE — TELEPHONE ENCOUNTER
Spoke with patient and reviewed results of breast MRI as Dr Ronaldo Castleman is out of the office this week  The recommendation is bilateral ultrasounds  Orders will be placed for ultrasounds and potential biopsies if deemed necessary  The patient is in agreement with moving forward with this  She will be scheduled per the RBC  All of her questions were answered today

## 2019-12-16 ENCOUNTER — HOSPITAL ENCOUNTER (OUTPATIENT)
Dept: ULTRASOUND IMAGING | Facility: CLINIC | Age: 24
Discharge: HOME | End: 2019-12-16
Payer: COMMERCIAL

## 2019-12-16 VITALS — DIASTOLIC BLOOD PRESSURE: 76 MMHG | SYSTOLIC BLOOD PRESSURE: 125 MMHG | HEART RATE: 76 BPM

## 2019-12-16 DIAGNOSIS — R92.8 ABNORMAL ULTRASOUND OF BREAST: ICD-10-CM

## 2019-12-16 DIAGNOSIS — R92.8 ABNORMAL MRI, BREAST: ICD-10-CM

## 2019-12-16 PROCEDURE — 19083 BX BREAST 1ST LESION US IMAG: CPT

## 2019-12-16 PROCEDURE — 88305 TISSUE EXAM BY PATHOLOGIST: CPT | Performed by: PATHOLOGY

## 2019-12-16 PROCEDURE — 88342 IMHCHEM/IMCYTCHM 1ST ANTB: CPT | Performed by: PATHOLOGY

## 2019-12-16 PROCEDURE — 19084 BX BREAST ADD LESION US IMAG: CPT

## 2019-12-16 PROCEDURE — 76642 ULTRASOUND BREAST LIMITED: CPT

## 2019-12-16 PROCEDURE — 88341 IMHCHEM/IMCYTCHM EA ADD ANTB: CPT | Performed by: PATHOLOGY

## 2019-12-16 RX ORDER — LIDOCAINE HYDROCHLORIDE 10 MG/ML
5 INJECTION, SOLUTION INFILTRATION; PERINEURAL ONCE
Status: COMPLETED | OUTPATIENT
Start: 2019-12-16 | End: 2019-12-16

## 2019-12-16 RX ADMIN — LIDOCAINE HYDROCHLORIDE 5 ML: 10 INJECTION, SOLUTION INFILTRATION; PERINEURAL at 09:19

## 2019-12-16 RX ADMIN — LIDOCAINE HYDROCHLORIDE 5 ML: 10 INJECTION, SOLUTION INFILTRATION; PERINEURAL at 09:24

## 2019-12-16 NOTE — PROGRESS NOTES
Patient arrived via:    __x___ambulatory    _____wheelchair    _____stretcher      Domestic violence screen    ____x__negative______positive    Breast Implants:    _______yes _____x___no

## 2019-12-16 NOTE — PROGRESS NOTES
Ice pack given:    ___x__yes _____no    Discharge instructions signed by patient:    ___x__yes _____no    Discharge instructions given to patient:    __x___yes _____no    Discharged via:    ___x__amulatory    _____wheelchair    _____stretcher    Stable on discharge:    ___x__yes ____no  Dr Rudy Ren is to give patient results

## 2019-12-16 NOTE — DISCHARGE INSTR - OTHER ORDERS
POST LARGE CORE BREAST BIOPSY PATIENT INFORMATION      1  Place an ice pack inside your bra over the top of the dressing every hour for 20 minutes (20 minutes on, 60 minutes off)  Do this until bedtime  2  Do not shower or bathe until the following morning  3  You may bathe your breast carefully with the steri-strips in place  Be careful    Not to loosen them  The steri-strips will fall off in 3-5 days  4  You may have mild discomfort, and you may have some bruising where the   Needle entered the skin  This should clear within 5-7 days  5  If you need medicine for discomfort, take acetaminophen products such as   Tylenol  You may also take Advil or Motrin products  6  Do not participate in strenuous activities such as-tennis, aerobics, skiing,  Weight lifting, etc  for 24 hours  Refrain from swimming/soaking for 72 hours  7  Wearing a bra for sleeping may be more comfortable for the first 24-48 hours  8  Watch for continued bleeding, pain or fever over 101; please call with any questions or concerns  For procedures done at the [facility]  [facility] [place] [facility] call:  Latesha Guido RN at [phone]  Debbie Sauer RN at [phone]                    *After 4 PM call the Interventional Radiology Department                    [phone] and ask to speak with the nurse on call  For procedures done at the [address] [place] [address] call:         Blanca Hess RN at   *After 4 PM call the Interventional Radiology Department   [phone] and ask to speak with the nurse on call  For procedures done at [address] [place] [address] call: The Radiology Nurse at [phone]  *After 4 PM call your physician, or go to the Emergency Department  9          The final results of your biopsy are usually available within one week

## 2019-12-16 NOTE — PROGRESS NOTES
Procedure type: Site B     __x___ultrasound guided _____stereotactic    Breast:    ___x__Left _____Right    Location: 1 o'clock 5 CMFN    Needle: 12 Gauge reinaldo     # of passes: 3    Clip: Wing    Performed by: Dr Areli Real held for 5 minutes by: Ceferino Carvalho Strips:    __x___yes _____no    Band aid:    __x___yes_____no    Tape and guaze:    _____yes __x___no    Tolerated procedure:    ___x__yes _____no

## 2019-12-17 NOTE — PROGRESS NOTES
Post procedure call completed    Bleeding: _____yes __X___no    Pain: _____yes ___X___no (Pt c/o tenderness, using ice and Tylenol)    Redness/Swelling: ______yes ___X___no (Pt denies)    Band aid removed: _____yes __X___no (Removing later today)    Steri-Strips intact: ___X___yes _____no    Pt with no questions at this time, adv Dr Lety Guerrero office will call when results available, adv to call with any questions or concerns, has name/# for contact

## 2019-12-18 ENCOUNTER — TELEPHONE (OUTPATIENT)
Dept: MAMMOGRAPHY | Facility: CLINIC | Age: 24
End: 2019-12-18

## 2019-12-18 ENCOUNTER — TELEPHONE (OUTPATIENT)
Dept: SURGICAL ONCOLOGY | Facility: CLINIC | Age: 24
End: 2019-12-18

## 2019-12-18 NOTE — TELEPHONE ENCOUNTER
Epic email sent to Nichole Barrios,    Just checking in to make sure you saw Lakesha's breast biopsy results  Please let me know if I can be of any further assistance       Thanks,   Janey Carreon, MSN, RN  Breast Nurse Navigator

## 2019-12-18 NOTE — TELEPHONE ENCOUNTER
Spoke with patient and reviewed negative bilateral breast biopsy results  Both biopsies revealed fibroadenomas  There is no atypia or malignancy identified  She states her biopsy sites are healing well although slightly tender  She will see Dr Xiomara Marks as previously scheduled on January 8th to discuss and schedule prophylactic surgery  All of her questions were answered today

## 2019-12-31 ENCOUNTER — OFFICE VISIT (OUTPATIENT)
Dept: GYNECOLOGY | Facility: CLINIC | Age: 24
End: 2019-12-31
Payer: COMMERCIAL

## 2019-12-31 VITALS
WEIGHT: 137.2 LBS | BODY MASS INDEX: 25.25 KG/M2 | SYSTOLIC BLOOD PRESSURE: 100 MMHG | DIASTOLIC BLOOD PRESSURE: 64 MMHG | HEART RATE: 67 BPM | HEIGHT: 62 IN

## 2019-12-31 DIAGNOSIS — N89.8 VAGINAL DISCHARGE: ICD-10-CM

## 2019-12-31 DIAGNOSIS — R39.9 URINARY TRACT INFECTION SYMPTOMS: ICD-10-CM

## 2019-12-31 DIAGNOSIS — N76.0 BV (BACTERIAL VAGINOSIS): ICD-10-CM

## 2019-12-31 DIAGNOSIS — B96.89 BV (BACTERIAL VAGINOSIS): ICD-10-CM

## 2019-12-31 DIAGNOSIS — B37.3 MONILIAL VULVOVAGINITIS: Primary | ICD-10-CM

## 2019-12-31 LAB
SL AMB  POCT GLUCOSE, UA: NORMAL
SL AMB LEUKOCYTE ESTERASE,UA: NORMAL
SL AMB POCT BILIRUBIN,UA: NORMAL
SL AMB POCT BLOOD,UA: NORMAL
SL AMB POCT CLARITY,UA: CLEAR
SL AMB POCT COLOR,UA: NORMAL
SL AMB POCT KETONES,UA: NORMAL
SL AMB POCT NITRITE,UA: NORMAL
SL AMB POCT PH,UA: 6.5
SL AMB POCT SPECIFIC GRAVITY,UA: 1.02
SL AMB POCT URINE PROTEIN: NORMAL
SL AMB POCT UROBILINOGEN: NORMAL

## 2019-12-31 PROCEDURE — 87210 SMEAR WET MOUNT SALINE/INK: CPT | Performed by: OBSTETRICS & GYNECOLOGY

## 2019-12-31 PROCEDURE — 99213 OFFICE O/P EST LOW 20 MIN: CPT | Performed by: OBSTETRICS & GYNECOLOGY

## 2019-12-31 PROCEDURE — 81002 URINALYSIS NONAUTO W/O SCOPE: CPT | Performed by: OBSTETRICS & GYNECOLOGY

## 2019-12-31 RX ORDER — METRONIDAZOLE 7.5 MG/G
GEL VAGINAL
Qty: 70 G | Refills: 0 | Status: SHIPPED | OUTPATIENT
Start: 2019-12-31 | End: 2020-01-10

## 2019-12-31 RX ORDER — FLUCONAZOLE 150 MG/1
TABLET ORAL
Qty: 2 TABLET | Refills: 0 | Status: SHIPPED | OUTPATIENT
Start: 2019-12-31 | End: 2020-01-03

## 2020-01-08 ENCOUNTER — HOSPITAL ENCOUNTER (OUTPATIENT)
Dept: RADIOLOGY | Facility: HOSPITAL | Age: 25
Discharge: HOME | End: 2020-01-08
Attending: SURGERY
Payer: COMMERCIAL

## 2020-01-08 ENCOUNTER — OFFICE VISIT (OUTPATIENT)
Dept: SURGICAL ONCOLOGY | Facility: CLINIC | Age: 25
End: 2020-01-08
Payer: COMMERCIAL

## 2020-01-08 ENCOUNTER — APPOINTMENT (OUTPATIENT)
Dept: LAB | Facility: CLINIC | Age: 25
End: 2020-01-08
Payer: COMMERCIAL

## 2020-01-08 VITALS
DIASTOLIC BLOOD PRESSURE: 80 MMHG | RESPIRATION RATE: 16 BRPM | TEMPERATURE: 99 F | BODY MASS INDEX: 24.66 KG/M2 | HEART RATE: 63 BPM | WEIGHT: 134 LBS | HEIGHT: 62 IN | SYSTOLIC BLOOD PRESSURE: 120 MMHG

## 2020-01-08 DIAGNOSIS — Z15.01 BRCA2 GENE MUTATION POSITIVE: ICD-10-CM

## 2020-01-08 DIAGNOSIS — Z15.09 BRCA2 GENE MUTATION POSITIVE: ICD-10-CM

## 2020-01-08 DIAGNOSIS — Z15.09 BRCA2 GENE MUTATION POSITIVE: Primary | ICD-10-CM

## 2020-01-08 DIAGNOSIS — Z15.01 BRCA2 GENE MUTATION POSITIVE: Primary | ICD-10-CM

## 2020-01-08 DIAGNOSIS — Z01.818 PREOP EXAMINATION: ICD-10-CM

## 2020-01-08 LAB
ALBUMIN SERPL BCP-MCNC: 4.2 G/DL (ref 3.5–5)
ALP SERPL-CCNC: 64 U/L (ref 46–116)
ALT SERPL W P-5'-P-CCNC: 24 U/L (ref 12–78)
ANION GAP SERPL CALCULATED.3IONS-SCNC: 9 MMOL/L (ref 4–13)
AST SERPL W P-5'-P-CCNC: 18 U/L (ref 5–45)
ATRIAL RATE: 51 BPM
BASOPHILS # BLD AUTO: 0.01 THOUSANDS/ΜL (ref 0–0.1)
BASOPHILS NFR BLD AUTO: 0 % (ref 0–1)
BILIRUB SERPL-MCNC: 0.32 MG/DL (ref 0.2–1)
BUN SERPL-MCNC: 16 MG/DL (ref 5–25)
CALCIUM SERPL-MCNC: 9.4 MG/DL (ref 8.3–10.1)
CHLORIDE SERPL-SCNC: 103 MMOL/L (ref 100–108)
CO2 SERPL-SCNC: 25 MMOL/L (ref 21–32)
CREAT SERPL-MCNC: 0.9 MG/DL (ref 0.6–1.3)
EOSINOPHIL # BLD AUTO: 0.04 THOUSAND/ΜL (ref 0–0.61)
EOSINOPHIL NFR BLD AUTO: 1 % (ref 0–6)
ERYTHROCYTE [DISTWIDTH] IN BLOOD BY AUTOMATED COUNT: 12.4 % (ref 11.6–15.1)
GFR SERPL CREATININE-BSD FRML MDRD: 90 ML/MIN/1.73SQ M
GLUCOSE SERPL-MCNC: 86 MG/DL (ref 65–140)
HCT VFR BLD AUTO: 42.4 % (ref 34.8–46.1)
HGB BLD-MCNC: 13.6 G/DL (ref 11.5–15.4)
IMM GRANULOCYTES # BLD AUTO: 0.01 THOUSAND/UL (ref 0–0.2)
IMM GRANULOCYTES NFR BLD AUTO: 0 % (ref 0–2)
LYMPHOCYTES # BLD AUTO: 2.29 THOUSANDS/ΜL (ref 0.6–4.47)
LYMPHOCYTES NFR BLD AUTO: 38 % (ref 14–44)
MCH RBC QN AUTO: 28.8 PG (ref 26.8–34.3)
MCHC RBC AUTO-ENTMCNC: 32.1 G/DL (ref 31.4–37.4)
MCV RBC AUTO: 90 FL (ref 82–98)
MONOCYTES # BLD AUTO: 0.51 THOUSAND/ΜL (ref 0.17–1.22)
MONOCYTES NFR BLD AUTO: 9 % (ref 4–12)
NEUTROPHILS # BLD AUTO: 3.11 THOUSANDS/ΜL (ref 1.85–7.62)
NEUTS SEG NFR BLD AUTO: 52 % (ref 43–75)
NRBC BLD AUTO-RTO: 0 /100 WBCS
P AXIS: 74 DEGREES
PLATELET # BLD AUTO: 252 THOUSANDS/UL (ref 149–390)
PMV BLD AUTO: 9.5 FL (ref 8.9–12.7)
POTASSIUM SERPL-SCNC: 4 MMOL/L (ref 3.5–5.3)
PR INTERVAL: 154 MS
PROT SERPL-MCNC: 7.4 G/DL (ref 6.4–8.2)
QRS AXIS: 87 DEGREES
QRSD INTERVAL: 74 MS
QT INTERVAL: 440 MS
QTC INTERVAL: 405 MS
RBC # BLD AUTO: 4.73 MILLION/UL (ref 3.81–5.12)
SODIUM SERPL-SCNC: 137 MMOL/L (ref 136–145)
T WAVE AXIS: 44 DEGREES
VENTRICULAR RATE: 51 BPM
WBC # BLD AUTO: 5.97 THOUSAND/UL (ref 4.31–10.16)

## 2020-01-08 PROCEDURE — 99214 OFFICE O/P EST MOD 30 MIN: CPT | Performed by: SURGERY

## 2020-01-08 PROCEDURE — 71046 X-RAY EXAM CHEST 2 VIEWS: CPT

## 2020-01-08 PROCEDURE — 36415 COLL VENOUS BLD VENIPUNCTURE: CPT

## 2020-01-08 PROCEDURE — 93010 ELECTROCARDIOGRAM REPORT: CPT | Performed by: INTERNAL MEDICINE

## 2020-01-08 PROCEDURE — 93005 ELECTROCARDIOGRAM TRACING: CPT

## 2020-01-08 PROCEDURE — 80053 COMPREHEN METABOLIC PANEL: CPT

## 2020-01-08 PROCEDURE — 85025 COMPLETE CBC W/AUTO DIFF WBC: CPT

## 2020-01-08 NOTE — H&P (VIEW-ONLY)
Surgical Oncology Follow Up       [address]  [facility] [place]  [address] [facility] [address]    Alexey Saavedra  1995  [id number]  [address] [facility] [facility]  [facility] [place]  [facility] [place] [phone]    Chief Complaint   Patient presents with    Follow-up     Pt is here for 2 weeks f/u          Assessment & Plan:   Padilla Munguia presents for a preoperative visit  She underwent an MRI which demonstrated bilateral masses were both of which were 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  She is decided to have her prophylactic surgery  She has met with Dr Eloise Jeffrey periareolar incision because of decreased side effects  The patient is in agreement to this  We went through the process of informed consent for bilateral simple mastectomies  All questions were answered the patient's satisfaction  I reviewed the complications as outlined on the consent form  Cancer History:      No history exists  Interval History:   See Assessment & Plan    Review of Systems:   Review of Systems   All other systems reviewed and are negative        Past Medical History     Patient Active Problem List   Diagnosis    Irritant contact dermatitis due to plant    Irritant contact dermatitis due to plants, except food    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    Angio-edema    Allergic asthma, mild intermittent, uncomplicated    Allergic rhinitis caused by mold    Seasonal allergic rhinitis due to pollen    Allergic rhinitis due to animal (cat) (dog) hair and dander    Acute atopic conjunctivitis, bilateral    Intrinsic eczema    PFAS (pollen-food allergy syndrome), initial encounter    Latex allergy, contact dermatitis    BRCA2 gene mutation positive    Exercise-induced asthma    Seborrhea capitis     Past Medical History:   Diagnosis Date    Anaphylaxis     tree nuts     BRCA positive     Cervical stenosis     Cervical stenosis (uterine cervix)     Dysmenorrhea     Hives     Inguinal mass     right    Irritable bowel     Menorrhagia     Pap smear for cervical cancer screening 05/31/2017    ASCUS, HPV negative    Vitamin D deficiency      Past Surgical History:   Procedure Laterality Date    KNEE CARTILAGE SURGERY      LYMPHADENECTOMY Bilateral     MASS EXCISION Right     inguinal, possible folliculitis    US GUIDED BREAST BIOPSY LEFT COMPLETE Left 12/16/2019    US GUIDED BREAST BIOPSY RIGHT COMPLETE Right 12/16/2019    WISDOM TOOTH EXTRACTION Bilateral      Family History   Problem Relation Age of Onset    Ovarian cancer Maternal Grandmother 48    Brain cancer Maternal Grandmother     BRCA 1/2 Mother         Prophylactic surgery    Hypertension Father     Heart disease Father     BRCA 1/2 Brother     Allergies Brother     BRCA 1/2 Maternal Uncle      Social History     Socioeconomic History    Marital status: Single     Spouse name: Not on file    Number of children: 0    Years of education: Not on file    Highest education level: Not on file   Occupational History    Occupation: [employer]      Comment: [employer]    Social Needs    Financial resource strain: Not on file    Food insecurity:     Worry: Not on file     Inability: Not on file    Transportation needs:     Medical: Not on file     Non-medical: Not on file   Tobacco Use    Smoking status: Never Smoker    Smokeless tobacco: Never Used   Substance and Sexual Activity    Alcohol use:  Yes     Alcohol/week: 4 0 standard drinks     Types: 4 Standard drinks or equivalent per week    Drug use: No    Sexual activity: Yes     Birth control/protection: None   Lifestyle    Physical activity:     Days per week: 7 days     Minutes per session: 120 min    Stress: Not on file   Relationships    Social connections:     Talks on phone: Not on file     Gets together: Not on file     Attends Bahai service: Not on file     Active member of club or organization: Not on file     Attends meetings of clubs or organizations: Not on file     Relationship status: Not on file    Intimate partner violence:     Fear of current or ex partner: Not on file     Emotionally abused: Not on file     Physically abused: Not on file     Forced sexual activity: Not on file   Other Topics Concern    Not on file   Social History Narrative    Patient lives in a single dwelling home     [facility] in the bedroom     Home is smoke free     Uses air      No humidifier     No dehumidifier     No basement     Window air conditioning         2 dogs  Dilly and Morales and there not allowed in the bedroom         Caffeine:  Does not consume     Chocolate occasional         Patient lives with mom and dad        Current Outpatient Medications:     albuterol (VENTOLIN HFA) 90 mcg/act inhaler, Inhale 2 puffs every 6 (six) hours as needed for wheezing, Disp: 18 g, Rfl: 3    EPINEPHrine (EPIPEN) 0 3 mg/0 3 mL SOAJ, Inject 0 3 mg into a muscle as needed for anaphylaxis, Disp: , Rfl: 1    mometasone (ELOCON) 0 1 % cream, Apply topically daily for 90 days, Disp: 45 g, Rfl: 1    saccharomyces boulardii (FLORASTOR) 250 mg capsule, Take 250 mg by mouth 2 (two) times a day, Disp: , Rfl:     Wheat Dextrin (BENEFIBER PO), Take by mouth daily, Disp: , Rfl:     Ciclopirox 1 % shampoo, Apply 1 Dose topically daily (Patient not taking: Reported on 12/31/2019), Disp: 120 mL, Rfl: 3    hyoscyamine (ANASPAZ,LEVSIN) 0 125 MG tablet, Take 0 125 mg by mouth every 4 (four) hours as needed for cramping, Disp: , Rfl:     metroNIDAZOLE (METROGEL) 0 75 % vaginal gel, Use from 1/1/20-1/5/20 (Patient not taking: Reported on 1/8/2020), Disp: 70 g, Rfl: 0  Allergies   Allergen Reactions    Apple Anaphylaxis    Cherry Anaphylaxis    Nuts Anaphylaxis    Peach [Prunus Persica] Anaphylaxis    Strawberry Extract Anaphylaxis    Latex Hives and Swelling    Nsaids Rash    Tolmetin Rash Physical Exam:     Vitals:    01/08/20 1107   BP: 120/80   Pulse: 63   Resp: 16   Temp: 99 °F (37 2 °C)     Physical Exam   Constitutional: She is oriented to person, place, and time  She appears well-developed and well-nourished  HENT:   Head: Normocephalic and atraumatic  Mouth/Throat: Oropharynx is clear and moist    Eyes: Pupils are equal, round, and reactive to light  EOM are normal    Neck: Normal range of motion  Neck supple  No JVD present  No tracheal deviation present  No thyromegaly present  Cardiovascular: Normal rate, regular rhythm, normal heart sounds and intact distal pulses  Exam reveals no gallop and no friction rub  No murmur heard  Pulmonary/Chest: Effort normal and breath sounds normal  No respiratory distress  She has no wheezes  She has no rales  Right breast exhibits no inverted nipple, no mass, no nipple discharge, no skin change and no tenderness  Left breast exhibits no inverted nipple, no mass, no nipple discharge, no skin change and no tenderness  Breasts are symmetrical      Both breasts were examined in the sitting and supine position  There are no worrisome skin lesions, no nipple retraction and no nipple discharge  There are no dominant masses, axillary adenopathy or supraclavicular adenopathy on either side  Abdominal: Soft  She exhibits no distension and no mass  There is no hepatomegaly  There is no tenderness  There is no rebound and no guarding  Musculoskeletal: Normal range of motion  She exhibits no edema or tenderness  Lymphadenopathy:     She has no cervical adenopathy  Neurological: She is alert and oriented to person, place, and time  No cranial nerve deficit  Skin: Skin is warm and dry  No rash noted  No erythema  Psychiatric: She has a normal mood and affect  Her behavior is normal    Vitals reviewed  Results & Discussion:   The patient I went through the process of informed consent for bilateral simple mastectomy    She is following with her gynecologist regarding her ovaries and will proceed with surgical prophylaxis once family planning is resolved  All questions were answered the patient's satisfaction  Advance Care Planning/Advance Directives:  I discussed the disease status, treatment plans and follow-up with the patient

## 2020-01-08 NOTE — PATIENT INSTRUCTIONS
Pre-Surgery Instructions:   Medication Instructions    albuterol (VENTOLIN HFA) 90 mcg/act inhaler Take morning of surgery if needed    EPINEPHrine (EPIPEN) 0 3 mg/0 3 mL SOAJ Take morning of surgery if needed    mometasone (ELOCON) 0 1 % cream Stop taking 1 days prior to surgery    saccharomyces boulardii (FLORASTOR) 250 mg capsule Stop taking 1 days prior to surgery    Wheat Dextrin (BENEFIBER PO) Stop taking 1 days prior to surgery         Mastectomy   AMBULATORY CARE:   What you need to know about a mastectomy:  A mastectomy is surgery to remove all or part of your breast  Tissue, lymph nodes, or muscle near the breast may also be removed  A mastectomy is done to treat breast cancer and prevent cancer from spreading  A mastectomy can also be done to prevent breast cancer  This may be a choice if you are at high risk for breast cancer  The type of mastectomy you need may depend on the size of the tumor  It may also depend on if the cancer has spread  How to prepare for a mastectomy:  Your healthcare provider will talk to you about how to prepare for surgery  He may tell you not to eat or drink anything after midnight on the day of your surgery  He will tell you what medicines to take or not take on the day of your surgery  You may need to stop taking aspirin or blood thinners several days before surgery  Arrange for someone to drive you home and stay with you after surgery  This person can help care for you and watch for complications from surgery  What will happen during a mastectomy:   · You will be given general anesthesia to keep you asleep and free from pain during surgery  You may be given an antibiotic through your IV to help prevent a bacterial infection  Your healthcare provider will make an incision over your breast  He will remove the tumor and breast tissue  He may also remove muscle from behind your breast  If lymph nodes will be taken, a small incision will be made in your armpit   The lymph nodes will be removed and tested for cancer  · One or more drains may be inserted near your incision  A drain removes extra fluid and helps your incision heal  Your healthcare provider will close your incision with stitches and cover it with a bandage  He may also wrap a tight-fitting bandage around both of your breasts  This may decrease swelling, bleeding, and pain  What will happen after a mastectomy:  Healthcare providers will monitor you until you are awake  You may need to spend 1 to 2 nights in the hospital  You may have difficulty moving your arm closest to your mastectomy  This should get better in a few days  Get out of bed and walk when your healthcare provider says it is safe  This will help prevent blood clots  Risks of a mastectomy:  You may bleed more than expected or get an infection  Nerves, blood vessels, and muscles may be damaged during your surgery  Blood or fluid may collect under your skin  You may need other procedures to remove the fluid or blood  You may have swelling in your arm closest to the mastectomy or where lymph nodes were removed  This swelling is called lymphedema  Lymphedema may cause tingling, numbness, stiffness, and weakness in your arm  This may be permanent  You may get a blood clot in your arm or leg  The blood clot may travel to your heart, lungs, or brain  This may become life-threatening  Call 911 for any of the following:   · You feel lightheaded, short of breath, and have chest pain  · You cough up blood  · You have trouble breathing  Seek care immediately if:   · Blood soaks through your bandage  · Your stitches come apart  · Your bruise suddenly gets bigger  · Your leg or arm is larger than normal and painful  Contact your healthcare provider if:   · You have a fever or chills  · Your wound is red, swollen, or draining pus  · You have nausea or are vomiting  · Your skin is itchy, swollen, or you have a rash      · Your pain does not get better after you take pain medicine  · Your drain falls out or stops draining fluid  · Your drain has pus or foul-smelling fluid coming out of it  · You have numbness, tingling, or swelling in your arm or hand  · You feel very sad or anxious  · You have trouble coping with your condition  · You have questions or concerns about your condition or care  Medicines: You may need any of the following:  · Antibiotics  help prevent a bacterial infection  · Prescription pain medicine  may be given  Ask your healthcare provider how to take this medicine safely  Some prescription pain medicines contain acetaminophen  Do not take other medicines that contain acetaminophen without talking to your healthcare provider  Too much acetaminophen may cause liver damage  Prescription pain medicine may cause constipation  Ask your healthcare provider how to prevent or treat constipation  · NSAIDs , such as ibuprofen, help decrease swelling, pain, and fever  NSAIDs can cause stomach bleeding or kidney problems in certain people  If you take blood thinner medicine, always ask your healthcare provider if NSAIDs are safe for you  Always read the medicine label and follow directions  · Take your medicine as directed  Contact your healthcare provider if you think your medicine is not helping or if you have side effects  Tell him or her if you are allergic to any medicine  Keep a list of the medicines, vitamins, and herbs you take  Include the amounts, and when and why you take them  Bring the list or the pill bottles to follow-up visits  Carry your medicine list with you in case of an emergency  Care for your wound as directed: If you have a tight-fitting bandage, you can remove it in 24 to 48 hours, or as directed  Ask your healthcare provider when your incision can get wet  You may need to take a sponge bath until your drain is removed  Carefully wash around the incision with soap and water   It is okay to allow the soap and water to gently run over your incision  Gently pat dry the area and put on new, clean bandages as directed  Change your bandages when they get wet or dirty  If lymph nodes were removed from your armpit, ask your healthcare provider when you can wear deodorant  Check your incision every day for redness, pus, or swelling  Self-care:   · Apply ice  on your incision for 15 to 20 minutes every hour or as directed  Use an ice pack, or put crushed ice in a plastic bag  Cover it with a towel  Ice helps prevent tissue damage and decreases swelling and pain  · Elevate  your arm nearest to your incision above the level of your heart  Do this as often as you can  This will help decrease swelling and pain  Prop your arm on pillows or blankets to keep it elevated comfortably  · Rest  as directed  Do not lift anything heavier than 5 pounds  Do not push or pull with your arms  You can use your arms to groom, eat, and bathe  Take short walks around the house  Gradually walk further as you feel better  Ask your healthcare provider when you can return to your normal activities  · Do not sleep on your stomach  This will put too much pressure on your incision  Sleep on your back or on the side opposite to your incision  · Empty your drain  as directed  You may need to write down how much fluid you empty from your drain each day  Ask your healthcare provider for more information about how to empty your drain  · Wear a supportive bra  as directed  Wait until you remove the tight-fitting bandage to wear a bra  You may be given a surgical bra or told to wear a sports bra  A supportive bra may help hold your bandages in place  It may also help with swelling and pain  Do not  wear bras with lace or underwire  They may rub against your incision and cause discomfort  Arm stretches: Your healthcare provider may show you how to do arm stretches   Arm stretches may prevent stiff arms or shoulders  You may need to wait until after your drains are removed to begin stretching  Do not do arm stretches until your healthcare provider says it is okay  Ask your healthcare provider for more information about arm stretches  More information and support: You may have difficulty coping with the changes to your body  Talk to your family or friends about how you are feeling  Ask your healthcare provider about support groups  It may be helpful to talk with other women who have had a mastectomy  · [address]  [place] , [place] [address]  Phone: [phone]  Web Address: http://[employer]/  [employer]  · [address] [facility], [address] [place] [address] , [address] [place] [address]  Phone: [phone]  Web Address: http://[employer]/  [employer]  Follow up with your healthcare provider as directed:  Write down your questions so you remember to ask them during your visits  © 2017 [address] [place] [address] Information is for End User's use only and may not be sold, redistributed or otherwise used for commercial purposes  All illustrations and images included in CareNotes® are the copyrighted property of A D A M , Inc  or [employer]  The above information is an  only  It is not intended as medical advice for individual conditions or treatments  Talk to your doctor, nurse or pharmacist before following any medical regimen to see if it is safe and effective for you  Fred-Alcantar Drain Care   AMBULATORY CARE:   A Fred-Alcantar (MARINA) drain  is used to remove fluids that build up in an area of your body after surgery  The MARINA drain is a bulb shaped device connected to a tube  One end of the tube is placed inside you during surgery  The other end comes out through a small cut in your skin  The bulb is connected to this end  You may have a stitch to hold the tube in place  Seek care immediately if:   · Your MARINA drain breaks or comes out       · You have cloudy yellow or brown drainage from your MARINA drain site, or the drainage smells bad  Contact your healthcare provider if:   · You drain less than 30 milliliters (2 tablespoons) in 24 hours  This may mean your drain can be removed  · You suddenly stop draining fluid or think your MARINA drain is blocked  · You have a fever higher than 101 5°F (38 6°C)  · You have increased pain, redness, or swelling around the drain site  · You have questions about your MARINA drain care  How a Fred-Alcantar drain works: The MARINA drain removes fluids by creating suction in the tube  The bulb is squeezed flat and connected to the tube that sticks out of your body  The bulb expands as it fills with fluid  How to change the bandage around your Fred-Alcantar drain:  If you have a bandage, change it once a day  You may need to change your bandage more than once a day if it gets completely wet  · Wash your hands with soap and water  · Loosen the tape and gently remove the old bandage  Throw the old bandage into a plastic trash bag  · Use soap and water or saline (salt water) solution to clean your MARINA drain site  Dip a cotton swab or gauze pad in the solution and gently clean your skin  · Pat the area dry  · Place a new bandage on your MARINA drain site and secure it to your skin with medical tape  · Wash your hands  How to empty the Fred-Alcantar drain:  Empty the bulb when it is half full or every 8 to 12 hours  · Wash your hands with soap and water  · Remove the plug from the bulb  · Pour the fluid into a measuring cup  · Clean the plug with an alcohol swab or a cotton ball dipped in rubbing alcohol  · Squeeze the bulb flat and put the plug back in  The bulb should stay flat until it starts to fill with fluid again  · Measure the amount of fluid you pour out  Write down how much fluid you empty from the MARINA drain and the date and time you collected it      · Flush the fluid down the toilet  Wash your hands  Clear clogged tubing: Use the following steps to clear your Fred-Alcantar tubing:  · Hold the tubing between your thumb and first finger at the place closest to your skin  This hand will prevent the tube from being pulled out of your skin  · Use your other thumb and first finger to slide the clog down the tubing toward the bulb  You may have to repeat the sliding until the tubing is unclogged  Fred-Alcantar drain removal:  The amount of fluid that you drain will decrease as your wound heals  The MARINA drain usually is removed when less than 30 milliliters (2 tablespoons) is collected in 24 hours  Ask your healthcare provider when and how your MARINA drain will be removed  Follow up with your healthcare provider as directed:  Write down your questions so you remember to ask them during your visits  © 2017 [address] Information is for End User's use only and may not be sold, redistributed or otherwise used for commercial purposes  All illustrations and images included in CareNotes® are the copyrighted property of A D A M , Inc  or [employer]  The above information is an  only  It is not intended as medical advice for individual conditions or treatments  Talk to your doctor, nurse or pharmacist before following any medical regimen to see if it is safe and effective for you

## 2020-01-08 NOTE — PROGRESS NOTES
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  She is decided to have her prophylactic surgery  She has met with Dr Tom Fulton periareolar incision because of decreased side effects  The patient is in agreement to this  We went through the process of informed consent for bilateral simple mastectomies  All questions were answered the patient's satisfaction  I reviewed the complications as outlined on the consent form  Cancer History:      No history exists  Interval History:   See Assessment & Plan    Review of Systems:   Review of Systems   All other systems reviewed and are negative        Past Medical History     Patient Active Problem List   Diagnosis    Irritant contact dermatitis due to plant    Irritant contact dermatitis due to plants, except food    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    Angio-edema    Allergic asthma, mild intermittent, uncomplicated    Allergic rhinitis caused by mold    Seasonal allergic rhinitis due to pollen    Allergic rhinitis due to animal (cat) (dog) hair and dander    Acute atopic conjunctivitis, bilateral    Intrinsic eczema    PFAS (pollen-food allergy syndrome), initial encounter    Latex allergy, contact dermatitis    BRCA2 gene mutation positive    Exercise-induced asthma    Seborrhea capitis     Past Medical History:   Diagnosis Date    Anaphylaxis     tree nuts     BRCA positive     Cervical stenosis     Cervical stenosis (uterine cervix)     Dysmenorrhea     Hives     Inguinal mass     right    Irritable bowel     Menorrhagia     Pap smear for cervical cancer screening 05/31/2017    ASCUS, HPV negative    Vitamin D deficiency      Past Surgical History:   Procedure Laterality Date    KNEE CARTILAGE SURGERY      LYMPHADENECTOMY Bilateral     MASS EXCISION Right     inguinal, possible folliculitis    US GUIDED BREAST BIOPSY LEFT COMPLETE Left 12/16/2019    US GUIDED BREAST BIOPSY RIGHT COMPLETE Right 12/16/2019    WISDOM TOOTH EXTRACTION Bilateral      Family History   Problem Relation Age of Onset    Ovarian cancer Maternal Grandmother 48    Brain cancer Maternal Grandmother     BRCA 1/2 Mother         Prophylactic surgery    Hypertension Father     Heart disease Father     BRCA 1/2 Brother     Allergies Brother     BRCA 1/2 Maternal Uncle      Social History     Socioeconomic History    Marital status: Single     Spouse name: Not on file    Number of children: 0    Years of education: Not on file    Highest education level: Not on file   Occupational History    Occupation: [employer]      Comment: [employer]    Social Needs    Financial resource strain: Not on file    Food insecurity:     Worry: Not on file     Inability: Not on file    Transportation needs:     Medical: Not on file     Non-medical: Not on file   Tobacco Use    Smoking status: Never Smoker    Smokeless tobacco: Never Used   Substance and Sexual Activity    Alcohol use:  Yes     Alcohol/week: 4 0 standard drinks     Types: 4 Standard drinks or equivalent per week    Drug use: No    Sexual activity: Yes     Birth control/protection: None   Lifestyle    Physical activity:     Days per week: 7 days     Minutes per session: 120 min    Stress: Not on file   Relationships    Social connections:     Talks on phone: Not on file     Gets together: Not on file     Attends [employer] service: Not on file     Active member of club or organization: Not on file     Attends meetings of clubs or organizations: Not on file     Relationship status: Not on file    Intimate partner violence:     Fear of current or ex partner: Not on file     Emotionally abused: Not on file     Physically abused: Not on file     Forced sexual activity: Not on file   Other Topics Concern    Not on file   Social History Narrative    Patient lives in a single dwelling home     [facility] in the bedroom     Home is smoke free     Uses air      No humidifier     No dehumidifier     No basement     Window air conditioning         2 dogs  Dilly and Morales and there not allowed in the bedroom         Caffeine:  Does not consume     Chocolate occasional         Patient lives with mom and dad        Current Outpatient Medications:     albuterol (VENTOLIN HFA) 90 mcg/act inhaler, Inhale 2 puffs every 6 (six) hours as needed for wheezing, Disp: 18 g, Rfl: 3    EPINEPHrine (EPIPEN) 0 3 mg/0 3 mL SOAJ, Inject 0 3 mg into a muscle as needed for anaphylaxis, Disp: , Rfl: 1    mometasone (ELOCON) 0 1 % cream, Apply topically daily for 90 days, Disp: 45 g, Rfl: 1    saccharomyces boulardii (FLORASTOR) 250 mg capsule, Take 250 mg by mouth 2 (two) times a day, Disp: , Rfl:     Wheat Dextrin (BENEFIBER PO), Take by mouth daily, Disp: , Rfl:     Ciclopirox 1 % shampoo, Apply 1 Dose topically daily (Patient not taking: Reported on 12/31/2019), Disp: 120 mL, Rfl: 3    hyoscyamine (ANASPAZ,LEVSIN) 0 125 MG tablet, Take 0 125 mg by mouth every 4 (four) hours as needed for cramping, Disp: , Rfl:     metroNIDAZOLE (METROGEL) 0 75 % vaginal gel, Use from 1/1/20-1/5/20 (Patient not taking: Reported on 1/8/2020), Disp: 70 g, Rfl: 0  Allergies   Allergen Reactions    Apple Anaphylaxis    Cherry Anaphylaxis    Nuts Anaphylaxis    Peach [Prunus Persica] Anaphylaxis    Strawberry Extract Anaphylaxis    Latex Hives and Swelling    Nsaids Rash    Tolmetin Rash Physical Exam:     Vitals:    01/08/20 1107   BP: 120/80   Pulse: 63   Resp: 16   Temp: 99 °F (37 2 °C)     Physical Exam   Constitutional: She is oriented to person, place, and time  She appears well-developed and well-nourished  HENT:   Head: Normocephalic and atraumatic  Mouth/Throat: Oropharynx is clear and moist    Eyes: Pupils are equal, round, and reactive to light  EOM are normal    Neck: Normal range of motion  Neck supple  No JVD present  No tracheal deviation present  No thyromegaly present  Cardiovascular: Normal rate, regular rhythm, normal heart sounds and intact distal pulses  Exam reveals no gallop and no friction rub  No murmur heard  Pulmonary/Chest: Effort normal and breath sounds normal  No respiratory distress  She has no wheezes  She has no rales  Right breast exhibits no inverted nipple, no mass, no nipple discharge, no skin change and no tenderness  Left breast exhibits no inverted nipple, no mass, no nipple discharge, no skin change and no tenderness  Breasts are symmetrical      Both breasts were examined in the sitting and supine position  There are no worrisome skin lesions, no nipple retraction and no nipple discharge  There are no dominant masses, axillary adenopathy or supraclavicular adenopathy on either side  Abdominal: Soft  She exhibits no distension and no mass  There is no hepatomegaly  There is no tenderness  There is no rebound and no guarding  Musculoskeletal: Normal range of motion  She exhibits no edema or tenderness  Lymphadenopathy:     She has no cervical adenopathy  Neurological: She is alert and oriented to person, place, and time  No cranial nerve deficit  Skin: Skin is warm and dry  No rash noted  No erythema  Psychiatric: She has a normal mood and affect  Her behavior is normal    Vitals reviewed  Results & Discussion:   The patient I went through the process of informed consent for bilateral simple mastectomy    She is following with her gynecologist regarding her ovaries and will proceed with surgical prophylaxis once family planning is resolved  All questions were answered the patient's satisfaction  Advance Care Planning/Advance Directives:  I discussed the disease status, treatment plans and follow-up with the patient

## 2020-01-10 RX ORDER — OMEPRAZOLE 40 MG/1
40 CAPSULE, DELAYED RELEASE ORAL AS NEEDED
COMMUNITY
End: 2021-03-10 | Stop reason: SDUPTHER

## 2020-01-10 NOTE — PRE-PROCEDURE INSTRUCTIONS
Pre-Surgery Instructions:   Medication Instructions    albuterol (VENTOLIN HFA) 90 mcg/act inhaler Instructed patient per Anesthesia Guidelines   EPINEPHrine (EPIPEN) 0 3 mg/0 3 mL SOAJ Instructed patient per Anesthesia Guidelines   hyoscyamine (ANASPAZ,LEVSIN) 0 125 MG tablet Instructed patient per Anesthesia Guidelines   mometasone (ELOCON) 0 1 % cream Instructed patient per Anesthesia Guidelines   omeprazole (PriLOSEC) 40 MG capsule Instructed patient per Anesthesia Guidelines   saccharomyces boulardii (FLORASTOR) 250 mg capsule Instructed patient per Anesthesia Guidelines   Wheat Dextrin (BENEFIBER PO) Instructed patient per Anesthesia Guidelines      Pre op,medications and showering instructions per Dr Len Raya reviewed-Patient has hibiclens-Instructed to bring [facility]

## 2020-01-14 ENCOUNTER — APPOINTMENT (EMERGENCY)
Dept: CT IMAGING | Facility: HOSPITAL | Age: 25
End: 2020-01-14
Payer: COMMERCIAL

## 2020-01-14 ENCOUNTER — HOSPITAL ENCOUNTER (EMERGENCY)
Facility: HOSPITAL | Age: 25
Discharge: HOME | End: 2020-01-14
Attending: EMERGENCY MEDICINE | Admitting: EMERGENCY MEDICINE
Payer: COMMERCIAL

## 2020-01-14 VITALS
HEIGHT: 62 IN | TEMPERATURE: 99.5 F | WEIGHT: 136.91 LBS | HEART RATE: 81 BPM | SYSTOLIC BLOOD PRESSURE: 114 MMHG | OXYGEN SATURATION: 100 % | BODY MASS INDEX: 25.19 KG/M2 | RESPIRATION RATE: 14 BRPM | DIASTOLIC BLOOD PRESSURE: 69 MMHG

## 2020-01-14 DIAGNOSIS — K52.9 ENTERITIS: Primary | ICD-10-CM

## 2020-01-14 DIAGNOSIS — R10.9 ABDOMINAL PAIN, VOMITING, AND DIARRHEA: ICD-10-CM

## 2020-01-14 DIAGNOSIS — R19.7 ABDOMINAL PAIN, VOMITING, AND DIARRHEA: ICD-10-CM

## 2020-01-14 DIAGNOSIS — R11.10 ABDOMINAL PAIN, VOMITING, AND DIARRHEA: ICD-10-CM

## 2020-01-14 LAB
ALBUMIN SERPL BCP-MCNC: 4.4 G/DL (ref 3.5–5)
ALP SERPL-CCNC: 60 U/L (ref 46–116)
ALT SERPL W P-5'-P-CCNC: 21 U/L (ref 12–78)
ANION GAP SERPL CALCULATED.3IONS-SCNC: 13 MMOL/L (ref 4–13)
AST SERPL W P-5'-P-CCNC: 18 U/L (ref 5–45)
BASOPHILS # BLD AUTO: 0 THOUSANDS/ΜL (ref 0–0.1)
BASOPHILS NFR BLD AUTO: 0 % (ref 0–1)
BILIRUB SERPL-MCNC: 0.8 MG/DL (ref 0.2–1)
BILIRUB UR QL STRIP: NEGATIVE
BUN SERPL-MCNC: 19 MG/DL (ref 5–25)
CALCIUM SERPL-MCNC: 9.3 MG/DL (ref 8.3–10.1)
CHLORIDE SERPL-SCNC: 103 MMOL/L (ref 100–108)
CLARITY UR: CLEAR
CO2 SERPL-SCNC: 25 MMOL/L (ref 21–32)
COLOR UR: YELLOW
CREAT SERPL-MCNC: 0.95 MG/DL (ref 0.6–1.3)
EOSINOPHIL # BLD AUTO: 0.01 THOUSAND/ΜL (ref 0–0.61)
EOSINOPHIL NFR BLD AUTO: 0 % (ref 0–6)
ERYTHROCYTE [DISTWIDTH] IN BLOOD BY AUTOMATED COUNT: 12.4 % (ref 11.6–15.1)
GFR SERPL CREATININE-BSD FRML MDRD: 84 ML/MIN/1.73SQ M
GLUCOSE SERPL-MCNC: 111 MG/DL (ref 65–140)
GLUCOSE UR STRIP-MCNC: NEGATIVE MG/DL
HCG SERPL QL: NEGATIVE
HCT VFR BLD AUTO: 48.4 % (ref 34.8–46.1)
HGB BLD-MCNC: 15.7 G/DL (ref 11.5–15.4)
HGB UR QL STRIP.AUTO: NEGATIVE
IMM GRANULOCYTES # BLD AUTO: 0.01 THOUSAND/UL (ref 0–0.2)
IMM GRANULOCYTES NFR BLD AUTO: 0 % (ref 0–2)
KETONES UR STRIP-MCNC: ABNORMAL MG/DL
LEUKOCYTE ESTERASE UR QL STRIP: NEGATIVE
LIPASE SERPL-CCNC: 92 U/L (ref 73–393)
LYMPHOCYTES # BLD AUTO: 0.3 THOUSANDS/ΜL (ref 0.6–4.47)
LYMPHOCYTES NFR BLD AUTO: 4 % (ref 14–44)
MCH RBC QN AUTO: 28.4 PG (ref 26.8–34.3)
MCHC RBC AUTO-ENTMCNC: 32.4 G/DL (ref 31.4–37.4)
MCV RBC AUTO: 88 FL (ref 82–98)
MONOCYTES # BLD AUTO: 0.34 THOUSAND/ΜL (ref 0.17–1.22)
MONOCYTES NFR BLD AUTO: 4 % (ref 4–12)
NEUTROPHILS # BLD AUTO: 7.45 THOUSANDS/ΜL (ref 1.85–7.62)
NEUTS SEG NFR BLD AUTO: 92 % (ref 43–75)
NITRITE UR QL STRIP: NEGATIVE
NRBC BLD AUTO-RTO: 0 /100 WBCS
PH UR STRIP.AUTO: 6 [PH]
PLATELET # BLD AUTO: 242 THOUSANDS/UL (ref 149–390)
PMV BLD AUTO: 9.7 FL (ref 8.9–12.7)
POTASSIUM SERPL-SCNC: 4.2 MMOL/L (ref 3.5–5.3)
PROT SERPL-MCNC: 8 G/DL (ref 6.4–8.2)
PROT UR STRIP-MCNC: NEGATIVE MG/DL
RBC # BLD AUTO: 5.53 MILLION/UL (ref 3.81–5.12)
SODIUM SERPL-SCNC: 141 MMOL/L (ref 136–145)
SP GR UR STRIP.AUTO: 1.01 (ref 1–1.03)
UROBILINOGEN UR QL STRIP.AUTO: 0.2 E.U./DL
WBC # BLD AUTO: 8.11 THOUSAND/UL (ref 4.31–10.16)

## 2020-01-14 PROCEDURE — 96361 HYDRATE IV INFUSION ADD-ON: CPT

## 2020-01-14 PROCEDURE — 85025 COMPLETE CBC W/AUTO DIFF WBC: CPT | Performed by: EMERGENCY MEDICINE

## 2020-01-14 PROCEDURE — 99284 EMERGENCY DEPT VISIT MOD MDM: CPT | Performed by: EMERGENCY MEDICINE

## 2020-01-14 PROCEDURE — 74177 CT ABD & PELVIS W/CONTRAST: CPT

## 2020-01-14 PROCEDURE — 36415 COLL VENOUS BLD VENIPUNCTURE: CPT | Performed by: EMERGENCY MEDICINE

## 2020-01-14 PROCEDURE — 84703 CHORIONIC GONADOTROPIN ASSAY: CPT | Performed by: EMERGENCY MEDICINE

## 2020-01-14 PROCEDURE — 96360 HYDRATION IV INFUSION INIT: CPT

## 2020-01-14 PROCEDURE — 83690 ASSAY OF LIPASE: CPT | Performed by: EMERGENCY MEDICINE

## 2020-01-14 PROCEDURE — 99284 EMERGENCY DEPT VISIT MOD MDM: CPT

## 2020-01-14 PROCEDURE — 80053 COMPREHEN METABOLIC PANEL: CPT | Performed by: EMERGENCY MEDICINE

## 2020-01-14 PROCEDURE — 81003 URINALYSIS AUTO W/O SCOPE: CPT | Performed by: EMERGENCY MEDICINE

## 2020-01-14 RX ORDER — ONDANSETRON 4 MG/1
4 TABLET, ORALLY DISINTEGRATING ORAL ONCE
Status: COMPLETED | OUTPATIENT
Start: 2020-01-14 | End: 2020-01-14

## 2020-01-14 RX ORDER — DICYCLOMINE HCL 20 MG
20 TABLET ORAL 2 TIMES DAILY PRN
Qty: 20 TABLET | Refills: 0 | Status: SHIPPED | OUTPATIENT
Start: 2020-01-14 | End: 2020-07-29

## 2020-01-14 RX ORDER — ONDANSETRON 4 MG/1
4 TABLET, ORALLY DISINTEGRATING ORAL EVERY 8 HOURS PRN
Qty: 10 TABLET | Refills: 0 | Status: SHIPPED | OUTPATIENT
Start: 2020-01-14 | End: 2020-07-29

## 2020-01-14 RX ORDER — SODIUM CHLORIDE, SODIUM GLUCONATE, SODIUM ACETATE, POTASSIUM CHLORIDE, MAGNESIUM CHLORIDE, SODIUM PHOSPHATE, DIBASIC, AND POTASSIUM PHOSPHATE .53; .5; .37; .037; .03; .012; .00082 G/100ML; G/100ML; G/100ML; G/100ML; G/100ML; G/100ML; G/100ML
1000 INJECTION, SOLUTION INTRAVENOUS ONCE
Status: DISCONTINUED | OUTPATIENT
Start: 2020-01-14 | End: 2020-01-14 | Stop reason: HOSPADM

## 2020-01-14 RX ORDER — DICYCLOMINE HYDROCHLORIDE 10 MG/1
10 CAPSULE ORAL ONCE
Status: COMPLETED | OUTPATIENT
Start: 2020-01-14 | End: 2020-01-14

## 2020-01-14 RX ADMIN — IOHEXOL 85 ML: 350 INJECTION, SOLUTION INTRAVENOUS at 04:47

## 2020-01-14 RX ADMIN — DICYCLOMINE HYDROCHLORIDE 10 MG: 10 CAPSULE ORAL at 04:02

## 2020-01-14 RX ADMIN — ONDANSETRON 4 MG: 4 TABLET, ORALLY DISINTEGRATING ORAL at 03:51

## 2020-01-14 RX ADMIN — SODIUM CHLORIDE 1000 ML: 0.9 INJECTION, SOLUTION INTRAVENOUS at 04:02

## 2020-01-14 NOTE — ED PROVIDER NOTES
History  Chief Complaint   Patient presents with    Abdominal Pain     pt c/o abd pain and vomiting since 2300 last night     25 y o  female presents with 6 hours of lower abdominal pain with radiation to the umbilicus  Patient describes severe squeezing that came on suddenly and continues in the ER  Patient states nothing aggravates the pain and nothing alleviates it  Patient has a history of irritable bowel syndrome and followed previously with Gastroenterology  Patient affirms multiple episodes of stomach containing vomiting  Patient affirms liquidy diarrhea  Patient notes last bowel movement was shortly prior to coming to the emergency room  Patient denies vaginal bleeding or discharge  Patient notes last menstrual cycle was approximately 2 weeks ago  ROS: Patient denies fever/chills, dyspnea, chest pain, constipation, diaphoresis, groin pain, dysuria, hematuria, melena, or back/neck pain  All other systems reviewed and negative  Patient denies contacts with similar symptoms  Patient affirms recent treatment with floconzaole for a yesterday; denies any recent use of antibiotics, international travel, or trauma  Patient notes history of no prior abdominal surgery  Focused Objective Exam:  Abdomen:  Inspection of an abdomen without previous abdominal surgical incisions noted without erythema, rashes or ecchymosis noted  No abdominal pulsations noted  Normal bowel sounds with no bruit auscultated  Soft abdomen  Palpitation noted tenderness in the lower abdominal quadrants with no tenderness in the upper; tenderness noted over McBurney's point  No masses or pulsatile aorta noted on examination  No rebound or guarding noted on examination, non-peritoneal exam   Negative psoas, obturator, and Rovsing's signs  Negative Bennett's sign  Back:  No rash or signs of herpes zoster  Skin:  No ecchymosis of the umbilicus (negative Millwood's sign) or flank (negative Chauhan Ortiz's sign)  Warm and dry  Medical Decision Making  Abdominal pain with a broad differential   Will establish IV access and make patient NPO considering possibility of surgical intervention required  Will initiate symptomatic management including intravenous fluids  Will obtain urine pregnancy to identify possible alternative diagnoses and etiologies of patient's pain  Differential includes significant likelihood of intra-abdominal pathology, including concerns for potential appendicitis  Considering patient's of irritable bowel syndrome, possibly secondary to this with multiple symptoms or potential infectious or inflammatory processes with patient's history  As patient does have tenderness directly over McBurney's point, will obtain imaging to evaluate for potential appendicitis but treat symptomatically for alternative etiologies while obtaining evaluation  Will obtain CBC to evaluate for anemia and leukocytosis  Will obtain CMP to evaluate electrolytes, renal, biliary, and hepatic function  Will obtain lipase to evaluate for potential pancreatitis  Will obtain urinalysis to evaluate for possible urinary infectious sources and ketones to evaluate potential hydration state  Based on patient's age, history, physical exam and presenting complaint, will obtain contrast enhanced CT imaging of patient's abdomen and pelvis to further evaluate for possible intraabdominal pathology          History provided by:  Patient  Abdominal Pain   Pain location:  LLQ and RLQ  Pain quality: squeezing    Pain radiates to:  Does not radiate  Pain severity:  Severe  Onset quality:  Sudden  Timing:  Constant  Progression:  Unchanged  Relieved by:  Nothing  Worsened by:  Nothing  Associated symptoms: diarrhea, nausea and vomiting    Associated symptoms: no belching, no chest pain, no chills, no constipation, no cough, no dysuria, no fatigue, no fever, no hematemesis, no hematochezia, no hematuria, no melena, no shortness of breath, no sore throat, no vaginal bleeding and no vaginal discharge        Prior to Admission Medications   Prescriptions Last Dose Informant Patient Reported? Taking?    EPINEPHrine (EPIPEN) 0 3 mg/0 3 mL SOAJ  Self Yes No   Sig: Inject 0 3 mg into a muscle as needed for anaphylaxis   Wheat Dextrin (BENEFIBER PO)  Self Yes No   Sig: Take by mouth as needed    albuterol (VENTOLIN HFA) 90 mcg/act inhaler  Self No No   Sig: Inhale 2 puffs every 6 (six) hours as needed for wheezing   mometasone (ELOCON) 0 1 % cream  Self No No   Sig: Apply topically daily for 90 days   Patient taking differently: Apply topically as needed    omeprazole (PriLOSEC) 40 MG capsule   Yes No   Sig: Take 40 mg by mouth as needed   saccharomyces boulardii (FLORASTOR) 250 mg capsule  Self Yes No   Sig: Take 250 mg by mouth 2 (two) times a day      Facility-Administered Medications: None       Past Medical History:   Diagnosis Date    Anaphylaxis     tree nuts     Asthma     " Exercise Induced "    BRCA positive     Cervical stenosis     Cervical stenosis (uterine cervix)     Dysmenorrhea     GERD (gastroesophageal reflux disease)     Hives     Inguinal mass     right    Irritable bowel     Menorrhagia     Pap smear for cervical cancer screening 05/31/2017    ASCUS, HPV negative    PONV (postoperative nausea and vomiting)     Vitamin D deficiency        Past Surgical History:   Procedure Laterality Date    COLONOSCOPY      CYST REMOVAL Right     Middle Finger    KNEE ARTHROSCOPY Right     KNEE CARTILAGE SURGERY      US GUIDED BREAST BIOPSY LEFT COMPLETE Left 12/16/2019    US GUIDED BREAST BIOPSY RIGHT COMPLETE Right 12/16/2019    WISDOM TOOTH EXTRACTION Bilateral        Family History   Problem Relation Age of Onset    Ovarian cancer Maternal Grandmother 48    Brain cancer Maternal Grandmother     BRCA 1/2 Mother         Prophylactic surgery    Hypertension Father     Heart disease Father     BRCA 1/2 Brother     Allergies Brother     BRCA 1/2 Maternal Uncle      I have reviewed and agree with the history as documented  Social History     Tobacco Use    Smoking status: Never Smoker    Smokeless tobacco: Never Used   Substance Use Topics    Alcohol use: Yes     Alcohol/week: 4 0 standard drinks     Types: 4 Glasses of wine per week     Frequency: 2-3 times a week     Drinks per session: 1 or 2     Binge frequency: Never     Comment: Socially    Drug use: No        Review of Systems   Constitutional: Negative for chills, fatigue and fever  HENT: Negative for sore throat  Respiratory: Negative for cough and shortness of breath  Cardiovascular: Negative for chest pain  Gastrointestinal: Positive for abdominal pain, diarrhea, nausea and vomiting  Negative for constipation, hematemesis, hematochezia and melena  Genitourinary: Negative for dysuria, hematuria, vaginal bleeding and vaginal discharge  All other systems reviewed and are negative  Physical Exam  Physical Exam   Constitutional: She appears well-developed and well-nourished  She appears distressed  HENT:   Head: Normocephalic and atraumatic  Mouth/Throat: Oropharynx is clear and moist    Eyes: Pupils are equal, round, and reactive to light  Neck: Neck supple  Cardiovascular: Normal rate  Pulmonary/Chest: Effort normal    Abdominal: Soft  Normal appearance and bowel sounds are normal  She exhibits no distension  There is tenderness in the right lower quadrant, suprapubic area and left lower quadrant  There is tenderness at McBurney's point  There is no rigidity, no rebound, no guarding, no CVA tenderness and negative Bennett's sign  Musculoskeletal: She exhibits no deformity  Neurological: She is alert  Skin: Skin is warm and dry  Psychiatric: She has a normal mood and affect  Vitals reviewed        Vital Signs  ED Triage Vitals [01/14/20 0339]   Temperature Pulse Respirations Blood Pressure SpO2   99 5 °F (37 5 °C) (!) 107 16 140/90 97 %      Temp Source Heart Rate Source Patient Position - Orthostatic VS BP Location FiO2 (%)   Oral Monitor Sitting Left arm --      Pain Score       Worst Possible Pain           Vitals:    01/14/20 0339 01/14/20 0449 01/14/20 0603   BP: 140/90 124/58 114/69   Pulse: (!) 107 92 81   Patient Position - Orthostatic VS: Sitting Sitting Sitting         Visual Acuity      ED Medications  Medications   ondansetron (ZOFRAN-ODT) dispersible tablet 4 mg (4 mg Oral Given 1/14/20 0351)   dicyclomine (BENTYL) capsule 10 mg (10 mg Oral Given 1/14/20 0402)   sodium chloride 0 9 % bolus 1,000 mL (0 mL Intravenous Stopped 1/14/20 0603)   iohexol (OMNIPAQUE) 350 MG/ML injection (MULTI-DOSE) 85 mL (85 mL Intravenous Given 1/14/20 0447)       Diagnostic Studies  Results Reviewed     Procedure Component Value Units Date/Time    UA w Reflex to Microscopic w Reflex to Culture [[id number]]  (Abnormal) Collected:  01/14/20 0448    Lab Status:  Final result Specimen:  Urine, Clean Catch Updated:  01/14/20 0456     Color, UA Yellow     Clarity, UA Clear     Specific Gravity, UA 1 010     pH, UA 6 0     Leukocytes, UA Negative     Nitrite, UA Negative     Protein, UA Negative mg/dl      Glucose, UA Negative mg/dl      Ketones, UA >=80 (3+) mg/dl      Urobilinogen, UA 0 2 E U /dl      Bilirubin, UA Negative     Blood, UA Negative    CBC and differential [[id number]]  (Abnormal) Collected:  01/14/20 0401    Lab Status:  Final result Specimen:  Blood from Arm, Left Updated:  01/14/20 0436     WBC 8 11 Thousand/uL      RBC 5 53 Million/uL      Hemoglobin 15 7 g/dL      Hematocrit 48 4 %      MCV 88 fL      MCH 28 4 pg      MCHC 32 4 g/dL      RDW 12 4 %      MPV 9 7 fL      Platelets 604 Thousands/uL      nRBC 0 /100 WBCs      Neutrophils Relative 92 %      Immat GRANS % 0 %      Lymphocytes Relative 4 %      Monocytes Relative 4 %      Eosinophils Relative 0 %      Basophils Relative 0 %      Neutrophils Absolute 7 45 Thousands/µL Immature Grans Absolute 0 01 Thousand/uL      Lymphocytes Absolute 0 30 Thousands/µL      Monocytes Absolute 0 34 Thousand/µL      Eosinophils Absolute 0 01 Thousand/µL      Basophils Absolute 0 00 Thousands/µL     Lipase [[id number]]  (Normal) Collected:  01/14/20 0401    Lab Status:  Final result Specimen:  Blood from Arm, Left Updated:  01/14/20 0431     Lipase 92 u/L     hCG, qualitative pregnancy [[id number]]  (Normal) Collected:  01/14/20 0401    Lab Status:  Final result Specimen:  Blood from Arm, Left Updated:  01/14/20 0431     Preg, Serum Negative    CMP [[id number]] Collected:  01/14/20 0401    Lab Status:  Final result Specimen:  Blood from Arm, Left Updated:  01/14/20 0424     Sodium 141 mmol/L      Potassium 4 2 mmol/L      Chloride 103 mmol/L      CO2 25 mmol/L      ANION GAP 13 mmol/L      BUN 19 mg/dL      Creatinine 0 95 mg/dL      Glucose 111 mg/dL      Calcium 9 3 mg/dL      AST 18 U/L      ALT 21 U/L      Alkaline Phosphatase 60 U/L      Total Protein 8 0 g/dL      Albumin 4 4 g/dL      Total Bilirubin 0 80 mg/dL      eGFR 84 ml/min/1 73sq m     Narrative:       [place] guidelines for Chronic Kidney Disease (CKD):     Stage 1 with normal or high GFR (GFR > 90 mL/min/1 73 square meters)    Stage 2 Mild CKD (GFR = 60-89 mL/min/1 73 square meters)    Stage 3A Moderate CKD (GFR = 45-59 mL/min/1 73 square meters)    Stage 3B Moderate CKD (GFR = 30-44 mL/min/1 73 square meters)    Stage 4 Severe CKD (GFR = 15-29 mL/min/1 73 square meters)    Stage 5 End Stage CKD (GFR <15 mL/min/1 73 square meters)  Note: GFR calculation is accurate only with a steady state creatinine                 CT abdomen pelvis with contrast   Final Result by Dilshad Luque MD (01/14 0456)      Findings consistent with enteritis            Workstation performed: [id number]                    Procedures  Procedures         ED Course  ED Course as of Miguel 15 0239   Tue Jan 14, 2020   0510 Patient's symptoms improved with treatment  Patient's laboratory finding consistent with dehydration  Patient tolerating oral intake in the emergency room after treatment  Discussed continued oral rehydration  Patient's CT with findings consistent with enteritis which I discussed with her  Discussed continued symptomatic management regarding this with dicyclomine which has improved her symptoms in the emergency room  Discussed risks and benefits of medication  Discussed follow-up  Patient has establish care with Gastroenterology  Discussed follow-up with primary care or Gastroenterology if symptoms persist or returning to emergency room with any worsening or change in symptoms  MDM      Disposition  Final diagnoses:   Enteritis   Abdominal pain, vomiting, and diarrhea     Time reflects when diagnosis was documented in both MDM as applicable and the Disposition within this note     Time User Action Codes Description Comment    1/14/2020  5:59 AM Rheba Mellissa Add [K52 9] Enteritis     1/14/2020  5:59 AM Rheba [place] Add [R10 9,  R11 10,  R19 7] Abdominal pain, vomiting, and diarrhea       ED Disposition     ED Disposition Condition Date/Time Comment    Discharge Stable Tue Jan 14, 2020  5:59 AM Cristina Hawthorne discharge to home/self care              Follow-up Information     Follow up With Specialties Details Why Contact Info Additional Information    Cheryle Koh, DO Family Medicine Schedule an appointment as soon as possible for a visit in 3 days Follow-up with primary care Gastroenterology if symptoms persist  Ian Figueroa [address] [place] [id number] [address] Emergency Department Emergency Medicine Go to  If symptoms worsen [address] [place] [address] ED, [address] [place], [place], [address]          Discharge Medication List as of 1/14/2020  6:00 AM      START taking these medications    Details   dicyclomine (BENTYL) 20 mg tablet Take 1 tablet (20 mg total) by mouth 2 (two) times a day as needed (Abdominal pain), Starting Tue 1/14/2020, Print      ondansetron (ZOFRAN-ODT) 4 mg disintegrating tablet Take 1 tablet (4 mg total) by mouth every 8 (eight) hours as needed for nausea or vomiting, Starting Tue 1/14/2020, Print         CONTINUE these medications which have NOT CHANGED    Details   albuterol (VENTOLIN HFA) 90 mcg/act inhaler Inhale 2 puffs every 6 (six) hours as needed for wheezing, Starting Sat 3/23/2019, Until Sun 3/22/2020, No Print      EPINEPHrine (EPIPEN) 0 3 mg/0 3 mL SOAJ Inject 0 3 mg into a muscle as needed for anaphylaxis, Starting Thu 12/27/2018, Historical Med      mometasone (ELOCON) 0 1 % cream Apply topically daily for 90 days, Starting Thu 3/21/2019, Until Fri 1/10/2020, Normal      omeprazole (PriLOSEC) 40 MG capsule Take 40 mg by mouth as needed, Historical Med      saccharomyces boulardii (FLORASTOR) 250 mg capsule Take 250 mg by mouth 2 (two) times a day, Historical Med      Wheat Dextrin (BENEFIBER PO) Take by mouth as needed , Historical Med           No discharge procedures on file      ED Provider  Electronically Signed by           Sergei Johnson MD  01/15/20 2584

## 2020-01-16 ENCOUNTER — VBI (OUTPATIENT)
Dept: ADMINISTRATIVE | Facility: OTHER | Age: 25
End: 2020-01-16

## 2020-01-29 ENCOUNTER — ANESTHESIA EVENT (OUTPATIENT)
Dept: PERIOP | Facility: HOSPITAL | Age: 25
End: 2020-01-29
Payer: COMMERCIAL

## 2020-01-30 ENCOUNTER — HOSPITAL ENCOUNTER (OUTPATIENT)
Facility: HOSPITAL | Age: 25
Setting detail: OUTPATIENT SURGERY
Discharge: HOME | End: 2020-01-31
Attending: SURGERY | Admitting: SURGERY
Payer: COMMERCIAL

## 2020-01-30 ENCOUNTER — ANESTHESIA (OUTPATIENT)
Dept: PERIOP | Facility: HOSPITAL | Age: 25
End: 2020-01-30
Payer: COMMERCIAL

## 2020-01-30 DIAGNOSIS — Z15.01 BRCA2 GENE MUTATION POSITIVE: Primary | ICD-10-CM

## 2020-01-30 DIAGNOSIS — Z15.09 BRCA2 GENE MUTATION POSITIVE: Primary | ICD-10-CM

## 2020-01-30 LAB
EXT PREGNANCY TEST URINE: NEGATIVE
EXT. CONTROL: NORMAL

## 2020-01-30 PROCEDURE — C9290 INJ, BUPIVACAINE LIPOSOME: HCPCS | Performed by: STUDENT IN AN ORGANIZED HEALTH CARE EDUCATION/TRAINING PROGRAM

## 2020-01-30 PROCEDURE — C1781 MESH (IMPLANTABLE): HCPCS | Performed by: SURGERY

## 2020-01-30 PROCEDURE — 81025 URINE PREGNANCY TEST: CPT | Performed by: STUDENT IN AN ORGANIZED HEALTH CARE EDUCATION/TRAINING PROGRAM

## 2020-01-30 PROCEDURE — 94760 N-INVAS EAR/PLS OXIMETRY 1: CPT

## 2020-01-30 PROCEDURE — C1789 PROSTHESIS, BREAST, IMP: HCPCS | Performed by: SURGERY

## 2020-01-30 PROCEDURE — 88307 TISSUE EXAM BY PATHOLOGIST: CPT | Performed by: PATHOLOGY

## 2020-01-30 PROCEDURE — 19303 MAST SIMPLE COMPLETE: CPT | Performed by: SURGERY

## 2020-01-30 PROCEDURE — 94664 DEMO&/EVAL PT USE INHALER: CPT

## 2020-01-30 PROCEDURE — 99024 POSTOP FOLLOW-UP VISIT: CPT | Performed by: PHYSICIAN ASSISTANT

## 2020-01-30 DEVICE — (128SQ CM) IMPLANT FLEXHD PLIABLE BRST 8 X 16CM 0.7-1.4MM THCK: Type: IMPLANTABLE DEVICE | Site: BREAST | Status: FUNCTIONAL

## 2020-01-30 DEVICE — SMOOTH, HIGH PROFILE, SUTURE TABS, INTEGRAL INJECTION DOME, 375CC
Type: IMPLANTABLE DEVICE | Site: BREAST | Status: FUNCTIONAL
Brand: ARTOURA BREAST TISSUE EXPANDER

## 2020-01-30 DEVICE — IMPLANTABLE DEVICE: Type: IMPLANTABLE DEVICE | Site: BREAST | Status: FUNCTIONAL

## 2020-01-30 RX ORDER — LIDOCAINE HYDROCHLORIDE 10 MG/ML
0.5 INJECTION, SOLUTION EPIDURAL; INFILTRATION; INTRACAUDAL; PERINEURAL ONCE AS NEEDED
Status: DISCONTINUED | OUTPATIENT
Start: 2020-01-30 | End: 2020-01-30 | Stop reason: HOSPADM

## 2020-01-30 RX ORDER — MIDAZOLAM HYDROCHLORIDE 2 MG/2ML
INJECTION, SOLUTION INTRAMUSCULAR; INTRAVENOUS AS NEEDED
Status: DISCONTINUED | OUTPATIENT
Start: 2020-01-30 | End: 2020-01-30 | Stop reason: SURG

## 2020-01-30 RX ORDER — OXYCODONE HYDROCHLORIDE AND ACETAMINOPHEN 5; 325 MG/1; MG/1
2 TABLET ORAL EVERY 4 HOURS PRN
Status: DISCONTINUED | OUTPATIENT
Start: 2020-01-30 | End: 2020-01-30

## 2020-01-30 RX ORDER — ONDANSETRON 2 MG/ML
INJECTION INTRAMUSCULAR; INTRAVENOUS AS NEEDED
Status: DISCONTINUED | OUTPATIENT
Start: 2020-01-30 | End: 2020-01-30 | Stop reason: SURG

## 2020-01-30 RX ORDER — DOCUSATE SODIUM 100 MG/1
100 CAPSULE, LIQUID FILLED ORAL 2 TIMES DAILY
Status: DISCONTINUED | OUTPATIENT
Start: 2020-01-30 | End: 2020-01-31 | Stop reason: HOSPADM

## 2020-01-30 RX ORDER — SODIUM CHLORIDE, SODIUM LACTATE, POTASSIUM CHLORIDE, CALCIUM CHLORIDE 600; 310; 30; 20 MG/100ML; MG/100ML; MG/100ML; MG/100ML
100 INJECTION, SOLUTION INTRAVENOUS CONTINUOUS
Status: DISCONTINUED | OUTPATIENT
Start: 2020-01-30 | End: 2020-01-31 | Stop reason: HOSPADM

## 2020-01-30 RX ORDER — DIPHENHYDRAMINE HCL 25 MG
50 TABLET ORAL EVERY 6 HOURS PRN
Status: DISCONTINUED | OUTPATIENT
Start: 2020-01-30 | End: 2020-01-31 | Stop reason: HOSPADM

## 2020-01-30 RX ORDER — DOCUSATE SODIUM 100 MG/1
100 CAPSULE, LIQUID FILLED ORAL 2 TIMES DAILY
Status: DISCONTINUED | OUTPATIENT
Start: 2020-01-30 | End: 2020-01-30 | Stop reason: SDUPTHER

## 2020-01-30 RX ORDER — ALBUTEROL SULFATE 90 UG/1
2 AEROSOL, METERED RESPIRATORY (INHALATION) EVERY 6 HOURS PRN
Status: DISCONTINUED | OUTPATIENT
Start: 2020-01-30 | End: 2020-01-31 | Stop reason: HOSPADM

## 2020-01-30 RX ORDER — ALBUMIN, HUMAN INJ 5% 5 %
SOLUTION INTRAVENOUS CONTINUOUS PRN
Status: DISCONTINUED | OUTPATIENT
Start: 2020-01-30 | End: 2020-01-30 | Stop reason: SURG

## 2020-01-30 RX ORDER — DEXAMETHASONE SODIUM PHOSPHATE 10 MG/ML
INJECTION, SOLUTION INTRAMUSCULAR; INTRAVENOUS AS NEEDED
Status: DISCONTINUED | OUTPATIENT
Start: 2020-01-30 | End: 2020-01-30 | Stop reason: SURG

## 2020-01-30 RX ORDER — PANTOPRAZOLE SODIUM 40 MG/1
40 TABLET, DELAYED RELEASE ORAL
Status: DISCONTINUED | OUTPATIENT
Start: 2020-01-31 | End: 2020-01-31 | Stop reason: HOSPADM

## 2020-01-30 RX ORDER — EPINEPHRINE 1 MG/ML
0.3 INJECTION, SOLUTION, CONCENTRATE INTRAVENOUS AS NEEDED
Status: DISCONTINUED | OUTPATIENT
Start: 2020-01-30 | End: 2020-01-31 | Stop reason: HOSPADM

## 2020-01-30 RX ORDER — GLYCOPYRROLATE 0.2 MG/ML
INJECTION INTRAMUSCULAR; INTRAVENOUS AS NEEDED
Status: DISCONTINUED | OUTPATIENT
Start: 2020-01-30 | End: 2020-01-30 | Stop reason: SURG

## 2020-01-30 RX ORDER — SODIUM CHLORIDE, SODIUM LACTATE, POTASSIUM CHLORIDE, CALCIUM CHLORIDE 600; 310; 30; 20 MG/100ML; MG/100ML; MG/100ML; MG/100ML
125 INJECTION, SOLUTION INTRAVENOUS CONTINUOUS
Status: DISCONTINUED | OUTPATIENT
Start: 2020-01-30 | End: 2020-01-30

## 2020-01-30 RX ORDER — KETAMINE HCL IN NACL, ISO-OSM 100MG/10ML
SYRINGE (ML) INJECTION AS NEEDED
Status: DISCONTINUED | OUTPATIENT
Start: 2020-01-30 | End: 2020-01-30 | Stop reason: SURG

## 2020-01-30 RX ORDER — HYDROMORPHONE HCL 110MG/55ML
PATIENT CONTROLLED ANALGESIA SYRINGE INTRAVENOUS AS NEEDED
Status: DISCONTINUED | OUTPATIENT
Start: 2020-01-30 | End: 2020-01-30 | Stop reason: SURG

## 2020-01-30 RX ORDER — PROPOFOL 10 MG/ML
INJECTION, EMULSION INTRAVENOUS CONTINUOUS PRN
Status: DISCONTINUED | OUTPATIENT
Start: 2020-01-30 | End: 2020-01-30 | Stop reason: SURG

## 2020-01-30 RX ORDER — HYDROMORPHONE HCL/PF 1 MG/ML
0.2 SYRINGE (ML) INJECTION
Status: DISCONTINUED | OUTPATIENT
Start: 2020-01-30 | End: 2020-01-30 | Stop reason: HOSPADM

## 2020-01-30 RX ORDER — BUPIVACAINE HYDROCHLORIDE 2.5 MG/ML
INJECTION, SOLUTION EPIDURAL; INFILTRATION; INTRACAUDAL AS NEEDED
Status: DISCONTINUED | OUTPATIENT
Start: 2020-01-30 | End: 2020-01-30 | Stop reason: SURG

## 2020-01-30 RX ORDER — PROMETHAZINE HYDROCHLORIDE 25 MG/ML
6.25 INJECTION, SOLUTION INTRAMUSCULAR; INTRAVENOUS ONCE AS NEEDED
Status: DISCONTINUED | OUTPATIENT
Start: 2020-01-30 | End: 2020-01-30 | Stop reason: HOSPADM

## 2020-01-30 RX ORDER — ROCURONIUM BROMIDE 10 MG/ML
INJECTION, SOLUTION INTRAVENOUS AS NEEDED
Status: DISCONTINUED | OUTPATIENT
Start: 2020-01-30 | End: 2020-01-30 | Stop reason: SURG

## 2020-01-30 RX ORDER — MORPHINE SULFATE 4 MG/ML
3 INJECTION, SOLUTION INTRAMUSCULAR; INTRAVENOUS
Status: DISCONTINUED | OUTPATIENT
Start: 2020-01-30 | End: 2020-01-31 | Stop reason: HOSPADM

## 2020-01-30 RX ORDER — ONDANSETRON 2 MG/ML
4 INJECTION INTRAMUSCULAR; INTRAVENOUS EVERY 6 HOURS PRN
Status: DISCONTINUED | OUTPATIENT
Start: 2020-01-30 | End: 2020-01-30 | Stop reason: SDUPTHER

## 2020-01-30 RX ORDER — CEFAZOLIN SODIUM 1 G/50ML
1000 SOLUTION INTRAVENOUS EVERY 8 HOURS
Status: DISCONTINUED | OUTPATIENT
Start: 2020-01-31 | End: 2020-01-31 | Stop reason: HOSPADM

## 2020-01-30 RX ORDER — MORPHINE SULFATE 4 MG/ML
3 INJECTION, SOLUTION INTRAMUSCULAR; INTRAVENOUS
Status: DISCONTINUED | OUTPATIENT
Start: 2020-01-30 | End: 2020-01-30

## 2020-01-30 RX ORDER — CEFAZOLIN SODIUM 1 G/50ML
1000 SOLUTION INTRAVENOUS ONCE
Status: COMPLETED | OUTPATIENT
Start: 2020-01-30 | End: 2020-01-30

## 2020-01-30 RX ORDER — PROPOFOL 10 MG/ML
INJECTION, EMULSION INTRAVENOUS AS NEEDED
Status: DISCONTINUED | OUTPATIENT
Start: 2020-01-30 | End: 2020-01-30 | Stop reason: SURG

## 2020-01-30 RX ORDER — HYDROCODONE BITARTRATE AND ACETAMINOPHEN 5; 325 MG/1; MG/1
2 TABLET ORAL EVERY 4 HOURS PRN
Status: DISCONTINUED | OUTPATIENT
Start: 2020-01-30 | End: 2020-01-31 | Stop reason: HOSPADM

## 2020-01-30 RX ORDER — FENTANYL CITRATE/PF 50 MCG/ML
25 SYRINGE (ML) INJECTION
Status: DISCONTINUED | OUTPATIENT
Start: 2020-01-30 | End: 2020-01-30 | Stop reason: HOSPADM

## 2020-01-30 RX ORDER — DICYCLOMINE HCL 20 MG
20 TABLET ORAL 2 TIMES DAILY PRN
Status: DISCONTINUED | OUTPATIENT
Start: 2020-01-30 | End: 2020-01-31 | Stop reason: HOSPADM

## 2020-01-30 RX ORDER — LIDOCAINE HYDROCHLORIDE 10 MG/ML
INJECTION, SOLUTION EPIDURAL; INFILTRATION; INTRACAUDAL; PERINEURAL AS NEEDED
Status: DISCONTINUED | OUTPATIENT
Start: 2020-01-30 | End: 2020-01-30 | Stop reason: SURG

## 2020-01-30 RX ORDER — ONDANSETRON 2 MG/ML
4 INJECTION INTRAMUSCULAR; INTRAVENOUS EVERY 4 HOURS PRN
Status: DISCONTINUED | OUTPATIENT
Start: 2020-01-30 | End: 2020-01-31 | Stop reason: HOSPADM

## 2020-01-30 RX ORDER — ONDANSETRON 2 MG/ML
4 INJECTION INTRAMUSCULAR; INTRAVENOUS ONCE AS NEEDED
Status: DISCONTINUED | OUTPATIENT
Start: 2020-01-30 | End: 2020-01-30 | Stop reason: HOSPADM

## 2020-01-30 RX ORDER — EPHEDRINE SULFATE 50 MG/ML
INJECTION INTRAVENOUS AS NEEDED
Status: DISCONTINUED | OUTPATIENT
Start: 2020-01-30 | End: 2020-01-30 | Stop reason: SURG

## 2020-01-30 RX ORDER — FENTANYL CITRATE 50 UG/ML
INJECTION, SOLUTION INTRAMUSCULAR; INTRAVENOUS AS NEEDED
Status: DISCONTINUED | OUTPATIENT
Start: 2020-01-30 | End: 2020-01-30 | Stop reason: SURG

## 2020-01-30 RX ORDER — NEOSTIGMINE METHYLSULFATE 1 MG/ML
INJECTION INTRAVENOUS AS NEEDED
Status: DISCONTINUED | OUTPATIENT
Start: 2020-01-30 | End: 2020-01-30 | Stop reason: SURG

## 2020-01-30 RX ORDER — PROMETHAZINE HYDROCHLORIDE 25 MG/1
25 TABLET ORAL 2 TIMES DAILY PRN
Status: DISCONTINUED | OUTPATIENT
Start: 2020-01-30 | End: 2020-01-31 | Stop reason: HOSPADM

## 2020-01-30 RX ORDER — ALBUTEROL SULFATE 2.5 MG/3ML
2.5 SOLUTION RESPIRATORY (INHALATION) ONCE AS NEEDED
Status: DISCONTINUED | OUTPATIENT
Start: 2020-01-30 | End: 2020-01-30 | Stop reason: HOSPADM

## 2020-01-30 RX ADMIN — FENTANYL CITRATE 25 MCG: 50 INJECTION, SOLUTION INTRAMUSCULAR; INTRAVENOUS at 14:53

## 2020-01-30 RX ADMIN — Medication 10 MG: at 13:31

## 2020-01-30 RX ADMIN — PROPOFOL 30 MG: 10 INJECTION, EMULSION INTRAVENOUS at 12:19

## 2020-01-30 RX ADMIN — EPHEDRINE SULFATE 10 MG: 50 INJECTION, SOLUTION INTRAVENOUS at 12:56

## 2020-01-30 RX ADMIN — ROCURONIUM BROMIDE 40 MG: 10 INJECTION, SOLUTION INTRAVENOUS at 12:19

## 2020-01-30 RX ADMIN — HYDROMORPHONE HYDROCHLORIDE 0.5 MG: 2 INJECTION INTRAMUSCULAR; INTRAVENOUS; SUBCUTANEOUS at 16:00

## 2020-01-30 RX ADMIN — Medication 10 MG: at 13:00

## 2020-01-30 RX ADMIN — NEOSTIGMINE METHYLSULFATE 3 MG: 1 INJECTION INTRAVENOUS at 17:19

## 2020-01-30 RX ADMIN — HYDROMORPHONE HYDROCHLORIDE 0.5 MG: 2 INJECTION INTRAMUSCULAR; INTRAVENOUS; SUBCUTANEOUS at 13:01

## 2020-01-30 RX ADMIN — ALBUMIN (HUMAN): 12.5 SOLUTION INTRAVENOUS at 15:30

## 2020-01-30 RX ADMIN — OXYCODONE HYDROCHLORIDE AND ACETAMINOPHEN 2 TABLET: 5; 325 TABLET ORAL at 19:35

## 2020-01-30 RX ADMIN — HYDROCODONE BITARTRATE AND ACETAMINOPHEN 2 TABLET: 5; 325 TABLET ORAL at 22:55

## 2020-01-30 RX ADMIN — CEFAZOLIN SODIUM 1000 MG: 1 SOLUTION INTRAVENOUS at 16:22

## 2020-01-30 RX ADMIN — LIDOCAINE HYDROCHLORIDE 50 MG: 10 INJECTION, SOLUTION EPIDURAL; INFILTRATION; INTRACAUDAL; PERINEURAL at 12:18

## 2020-01-30 RX ADMIN — FENTANYL CITRATE 25 MCG: 50 INJECTION, SOLUTION INTRAMUSCULAR; INTRAVENOUS at 18:14

## 2020-01-30 RX ADMIN — ROCURONIUM BROMIDE 20 MG: 10 INJECTION, SOLUTION INTRAVENOUS at 13:35

## 2020-01-30 RX ADMIN — BUPIVACAINE HYDROCHLORIDE 30 ML: 2.5 INJECTION, SOLUTION EPIDURAL; INFILTRATION; INTRACAUDAL at 12:45

## 2020-01-30 RX ADMIN — FENTANYL CITRATE 50 MCG: 50 INJECTION, SOLUTION INTRAMUSCULAR; INTRAVENOUS at 13:00

## 2020-01-30 RX ADMIN — SODIUM CHLORIDE, SODIUM LACTATE, POTASSIUM CHLORIDE, AND CALCIUM CHLORIDE: .6; .31; .03; .02 INJECTION, SOLUTION INTRAVENOUS at 16:00

## 2020-01-30 RX ADMIN — Medication 10 MG: at 14:45

## 2020-01-30 RX ADMIN — FENTANYL CITRATE 50 MCG: 50 INJECTION, SOLUTION INTRAMUSCULAR; INTRAVENOUS at 12:18

## 2020-01-30 RX ADMIN — DEXAMETHASONE SODIUM PHOSPHATE 4 MG: 10 INJECTION, SOLUTION INTRAMUSCULAR; INTRAVENOUS at 12:19

## 2020-01-30 RX ADMIN — HYDROMORPHONE HYDROCHLORIDE 0.2 MG: 1 INJECTION, SOLUTION INTRAMUSCULAR; INTRAVENOUS; SUBCUTANEOUS at 18:21

## 2020-01-30 RX ADMIN — FENTANYL CITRATE 50 MCG: 50 INJECTION, SOLUTION INTRAMUSCULAR; INTRAVENOUS at 17:22

## 2020-01-30 RX ADMIN — SODIUM CHLORIDE, SODIUM LACTATE, POTASSIUM CHLORIDE, AND CALCIUM CHLORIDE: .6; .31; .03; .02 INJECTION, SOLUTION INTRAVENOUS at 12:14

## 2020-01-30 RX ADMIN — FENTANYL CITRATE 25 MCG: 50 INJECTION, SOLUTION INTRAMUSCULAR; INTRAVENOUS at 18:19

## 2020-01-30 RX ADMIN — ONDANSETRON 4 MG: 2 INJECTION INTRAMUSCULAR; INTRAVENOUS at 17:19

## 2020-01-30 RX ADMIN — FENTANYL CITRATE 50 MCG: 50 INJECTION, SOLUTION INTRAMUSCULAR; INTRAVENOUS at 16:08

## 2020-01-30 RX ADMIN — FENTANYL CITRATE 25 MCG: 50 INJECTION, SOLUTION INTRAMUSCULAR; INTRAVENOUS at 13:56

## 2020-01-30 RX ADMIN — CEFAZOLIN SODIUM 1000 MG: 1 SOLUTION INTRAVENOUS at 12:29

## 2020-01-30 RX ADMIN — FENTANYL CITRATE 25 MCG: 50 INJECTION, SOLUTION INTRAMUSCULAR; INTRAVENOUS at 18:09

## 2020-01-30 RX ADMIN — PROPOFOL 50 MCG/KG/MIN: 10 INJECTION, EMULSION INTRAVENOUS at 13:29

## 2020-01-30 RX ADMIN — DOCUSATE SODIUM 100 MG: 100 CAPSULE, LIQUID FILLED ORAL at 19:35

## 2020-01-30 RX ADMIN — HYDROMORPHONE HYDROCHLORIDE 0.2 MG: 1 INJECTION, SOLUTION INTRAMUSCULAR; INTRAVENOUS; SUBCUTANEOUS at 18:28

## 2020-01-30 RX ADMIN — SODIUM CHLORIDE, SODIUM LACTATE, POTASSIUM CHLORIDE, AND CALCIUM CHLORIDE 100 ML/HR: .6; .31; .03; .02 INJECTION, SOLUTION INTRAVENOUS at 19:41

## 2020-01-30 RX ADMIN — Medication 10 MG: at 15:30

## 2020-01-30 RX ADMIN — GLYCOPYRROLATE 0.4 MG: 0.2 INJECTION, SOLUTION INTRAMUSCULAR; INTRAVENOUS at 17:19

## 2020-01-30 RX ADMIN — MIDAZOLAM HYDROCHLORIDE 2 MG: 1 INJECTION, SOLUTION INTRAMUSCULAR; INTRAVENOUS at 12:10

## 2020-01-30 RX ADMIN — ROCURONIUM BROMIDE 10 MG: 10 INJECTION, SOLUTION INTRAVENOUS at 14:46

## 2020-01-30 RX ADMIN — DIPHENHYDRAMINE HCL 50 MG: 25 TABLET ORAL at 19:34

## 2020-01-30 RX ADMIN — ONDANSETRON 4 MG: 2 INJECTION INTRAMUSCULAR; INTRAVENOUS at 12:10

## 2020-01-30 RX ADMIN — Medication 10 MG: at 14:12

## 2020-01-30 RX ADMIN — PROPOFOL 150 MG: 10 INJECTION, EMULSION INTRAVENOUS at 12:18

## 2020-01-30 RX ADMIN — ROCURONIUM BROMIDE 10 MG: 10 INJECTION, SOLUTION INTRAVENOUS at 13:00

## 2020-01-30 RX ADMIN — BUPIVACAINE 20 ML: 13.3 INJECTION, SUSPENSION, LIPOSOMAL INFILTRATION at 12:45

## 2020-01-30 RX ADMIN — MORPHINE SULFATE 3 MG: 4 INJECTION INTRAVENOUS at 20:56

## 2020-01-30 RX ADMIN — ALBUMIN (HUMAN): 12.5 SOLUTION INTRAVENOUS at 13:56

## 2020-01-30 NOTE — INTERVAL H&P NOTE
H&P reviewed  After examining the patient I find no changes in the patients condition since the H&P had been written      Vitals:    01/30/20 1050   BP: 119/62   Pulse: 84   Resp: 18   Temp: 97 6 °F (36 4 °C)   SpO2: 99%

## 2020-01-30 NOTE — ANESTHESIA POSTPROCEDURE EVALUATION
Post-Op Assessment Note    CV Status:  Stable  Pain Score: 0    Pain management: adequate     Mental Status:  Sleepy   Hydration Status:  Euvolemic   PONV Controlled:  Controlled   Airway Patency:  Patent   Post Op Vitals Reviewed: Yes      Staff: CRNA   Comments: vss sv nonobstructed uneventful          BP   109/55   Temp 97 9   Pulse 80   Resp 24   SpO2 98

## 2020-01-30 NOTE — ANESTHESIA PREPROCEDURE EVALUATION
Review of Systems/Medical History  Patient summary reviewed  Chart reviewed  History of anesthetic complications     Cardiovascular  Negative cardio ROS Exercise tolerance (METS): >4,     Pulmonary  Negative pulmonary ROS        GI/Hepatic  Negative GI/hepatic ROS   GERD ,        Negative  ROS        Endo/Other  Negative endo/other ROS      GYN  Negative gynecology ROS          Hematology  Negative hematology ROS      Musculoskeletal  Negative musculoskeletal ROS        Neurology  Negative neurology ROS      Psychology   Negative psychology ROS              Physical Exam    Airway    Mallampati score: II  TM Distance: >3 FB  Neck ROM: full     Dental   No notable dental hx     Cardiovascular  Comment: Negative ROS, Rhythm: regular, Rate: normal, Cardiovascular exam normal    Pulmonary  Pulmonary exam normal Breath sounds clear to auscultation,     Other Findings        Anesthesia Plan  ASA Score- 2     Anesthesia Type- general and regional with ASA Monitors  Additional Monitors:   Airway Plan: ETT  Comment: Risks/benefits and alternatives discussed with patient including possible PONV, sore throat, and possibility of rare anesthetic and surgical emergencies  Bilateraly Pec blocks in addition to general anesthesia with OETT  Plan Factors- Patient instructed to abstain from smoking on day of procedure  Patient did not smoke on day of surgery  Induction- intravenous  Postoperative Plan- Plan for postoperative opioid use  Planned trial extubation    Informed Consent- Anesthetic plan and risks discussed with patient  I personally reviewed this patient with the CRNA  Discussed and agreed on the Anesthesia Plan with the CRNA  Alba Buchanan

## 2020-01-30 NOTE — OP NOTE
OPERATIVE REPORT  PATIENT NAME: Chirag Rodrigues    :  1995  MRN: [id number]  Pt Location: AN OR ROOM 03    SURGERY DATE: 2020    Surgeon(s) and Role:  Panel 1:     * Shara Dodson MD - Primary     * Beau Bonilla MD - Assisting  Panel 2:     * Tash Fortune MD - Primary    Preop Diagnosis:  BRCA2 gene mutation positive [Z15 , Z15 09]    Post-Op Diagnosis Codes:     * BRCA2 gene mutation positive [Z15 , Z15 09]    Procedure(s) (LRB):  BREAST SIMPLE MASTECTOMY (Bilateral)  BREAST INSERTION/PLACEMENT TISSUE EXPANDER (EXCHANGE) (Bilateral)  BREAST RECONSTRUCTION WITH IMPLANT (Bilateral)  TRUNK LOCAL FLAP (Bilateral)    Specimen(s):  ID Type Source Tests Collected by Time Destination   1 :  Tissue Breast, Left TISSUE EXAM Shara Dodson MD 2020 1306    2 :  Tissue Breast, Right TISSUE EXAM Shara Dodson MD 2020 1307        Estimated Blood Loss:   115 mL    Drains:  Urethral Catheter Non-latex 16 Fr  (Active)   Number of days: 0       Anesthesia Type:   General    Operative Indications:  BRCA2 gene mutation positive [Z15 , Z15 09]      Operative Findings:  Normal anatomy, no gross disease identified    Complications:   None    Procedure and Technique:  The patient was brought to the operation room and placed under general anesthesia  Attention was paid to ensure appropriate padding and correct positioning  The bilateral breast were then prepped and draped in a sterile fashion  I initiated a time-out, identifying the patient, the correct side and the above procedure  All parties agreed with the time out  The planned left breast incision was then sharply incised  Thin flaps were then elevated using electrocautery starting superiorly and then continuing medially, inferiorly and finally laterally  The tail of Alda Phillips was then dissected away from the axilla proper leaving the breast tethered to the underlying musculature  The breast was then removed from the muscles in a sub-facial plane   The breast was oriented with sutures (short=superior; medium=medial; long=lateral)  The breast was sent to pathology in formalin for permanent pathologic evaluation  Meticulous hemostasis was maintained with electrocautery  Attention was then turned to the right side  The planned right breast incision was then sharply incised  Thin flaps were then elevated using electrocautery starting superiorly and then continuing medially, inferiorly and finally laterally  The tail of Susanna Elise was then dissected away from the axilla proper leaving the breast tethered to the underlying musculature  The breast was then removed from the muscles in a sub-facial plane  The breast was oriented with sutures (short=superior; medium=medial; long=lateral)  The breast was sent to pathology in formalin for permanent pathologic evaluation  Meticulous hemostasis was maintained with electrocautery  The breast were each packed with a surgical lap  The counts were correct x 2  Dr Severino Carrillo will initiate reconstruction         I was present for the entire procedure    Patient Disposition:  PACU     SIGNATURE: Cas Mckeon MD  DATE: January 30, 2020  TIME: 2:03 PM

## 2020-01-30 NOTE — DISCHARGE INSTRUCTIONS
[address], [address] [facility], [address] [facility], [facility], [address] [facility] /Q / [facility]  [facility]       No heavy lifting >20 pounds    Do not raise hands above head    Consider using button up shirts so you don't have to raise your hands above your head to put the shirt on    Wear the surgical bra at all times except the shower   If the bra is tight and is leaving marks on the skin unclasp the bottom clasps of the bra    No ice or heating pack to chest    Ok to shower    Measure drain output three times per day    Keep the drains secured below the level of the breast  Securing at the waist level is best    No bathing    Leave the transparent and Chemehuevi dressing over the drain site in place until you're seen in the office    Apply antibiotic ointment to drain site three times/day if the transparent and Chemehuevi dressing falls off    Do not lay on stomach    Do not lay on your sides    Use a necklace or lanyard in the shower to support drains    No smoking    No pushing or pulling    Call the office for an appointment in 5-10 days - [phone]

## 2020-01-30 NOTE — OP NOTE
OPERATIVE REPORT  PATIENT NAME: Tommy Nava    :  1995  MRN: [id number]  Pt Location: AN OR ROOM 03    SURGERY DATE: 2020    Surgeon(s) and Role:  Panel 1:     * Alexandra Morocho MD - Primary     * Paulette Lugo MD - Assisting  Panel 2:     * Mohinder Trinh MD - Primary    Preop Diagnosis:  BRCA2 gene mutation positive [Z15 , Z15 09]    Post-Op Diagnosis Codes:     * BRCA2 gene mutation positive [Z15 , Z15 09]    Procedure(s) (LRB):  BREAST SIMPLE MASTECTOMY (Bilateral)  BREAST INSERTION/PLACEMENT TISSUE EXPANDER (EXCHANGE) (Bilateral)  BREAST RECONSTRUCTION WITH IMPLANT (Bilateral)  TRUNK LOCAL FLAP (Bilateral)    Specimen(s):  ID Type Source Tests Collected by Time Destination   1 :  Tissue Breast, Left TISSUE EXAM Alexandra Morocho MD 2020 1306    2 :  Tissue Breast, Right TISSUE Frutoso MD Kacey 2020 1307        Estimated Blood Loss:   25 mL    Drains:  Closed/Suction Drain Right Breast Bulb 15 Fr  (Active)   Number of days: 0       Closed/Suction Drain Right Breast Bulb 15 Fr  (Active)   Number of days: 0       Closed/Suction Drain Left Breast Bulb 15 Fr  (Active)   Number of days: 0       Closed/Suction Drain Left Breast Bulb 15 Fr  (Active)   Number of days: 0       Urethral Catheter Non-latex 16 Fr  (Active)   Number of days: 0       Anesthesia Type:   General    Operative Indications:  BRCA2 gene mutation positive [Z15 , Z15 ]      Operative Findings:      Complications:   None    Procedure and Technique:  The patient was marked while standing prior to surgery  The patient was brought to the operating room and placed supine on the operating room table  Time out procedure was performed, SCDs were applied and IV antibiotics were given  The patient underwent bilateral mastectomy and will be dictated separately  The chest was re-prepped and draped using standard surgical technique       Attention was turned to the right breast  The wound was examined and excellent hemostasis was obtained  The Inframammary fold was reconstructed using an internal candace flap  A 2-0 PDS suture was used to suture the deep dermis of the marked area of the inframammary fold  The skin was elevated 1 cm and secured to rib periosteum, this was performed across the length of the inframammary fold for and total flap plus elevation of 14cmsq  The superior extent of the dissection was determined by the height of the expander  Flexhd was rehydrated according to protocol  This was cut to the shape of the inframammary fold  The dermal element side was placed facing the skin  The flexhd was inset to the inframammary fold and borders of the new pocket using using 2-0 PDS in hoizontal mattress technique  15 [place] of [place] round lyla drains  were tunneled laterally and brought out through the lateral chest  These were secured with a 3-0 nylon suture  Excellent hemostasis was achieved  The pocket was irrigated with antibiotic solution and allowed to dwell for 5 minutes  Attention was turned to the left breast  The wound was examined and excellent hemostasis was obtained  The Inframammary fold was reconstructed using an internal candace flap  A 2-0 PDS suture was used to suture the deep dermis of the marked area of the inframammary fold  The skin was elevated 1 cm and secured to rib periosteum, this was performed across the length of the inframammary fold for and total flap plus elevation of 14cmsq  The superior extent of the dissection was determined by the height of the expander  A piece of flexhd was rehydrated according to protocol  This was cut to the shape of the inframammary fold  The dermal element side was placed facing the skin  The flexhd was inset to the inframammary fold and borders of the new pocket usingusing 2-0 PDS in hoizontal mattress technique  15 Citizen [place] round lyla drains were tunneled laterally and brought out through the lateral chest  These was secured with a 3-0 nylon suture   Excellent hemostasis was achieved  The pocket was irrigated with antibiotic solution and allowed to dwell for 5 minutes  The skin was re prepped, all surgical team members changed their gloves  Raleigh artoura 375ml expanders were prepared  All air was removed from the devices  50 ml of saline was injected into each device  The expanders were placed into the breast pockets  The expander tabs were inset to the inframammary folds using 2-0 PDS suture  Flex HD was placed to cover the entire anterior surface of the implants  The flex HD was inset using 2-0 and 3-0 PDS suture in interrupted technique  The skin was closed using 3-0 vicryl and 3-0 PDS suture in interrupted deep dermal technique  The superficial skin was closed using 3-0 monocryl suture in running subcuticular technique  The wounds were cleaned and dried  Benzoin, steri strips and skin glue were applied  Biopatches were placed around the drain sites  Sterile gauze and a surgical bra was placed  The pa assisted with obtaining hemostasis and retracting  The patient tolerated the procedure well and was taken to the recovery room in stable condition         I was present for the entire procedure and A qualified resident physician was not available    Patient Disposition:  hemodynamically stable and extubated and stable    SIGNATURE: Marcy Leyva MD  DATE: January 30, 2020  TIME: 5:36 PM

## 2020-01-31 VITALS
OXYGEN SATURATION: 98 % | DIASTOLIC BLOOD PRESSURE: 60 MMHG | HEIGHT: 62 IN | SYSTOLIC BLOOD PRESSURE: 118 MMHG | WEIGHT: 130 LBS | RESPIRATION RATE: 16 BRPM | TEMPERATURE: 98.9 F | HEART RATE: 63 BPM | BODY MASS INDEX: 23.92 KG/M2

## 2020-01-31 PROCEDURE — 99024 POSTOP FOLLOW-UP VISIT: CPT | Performed by: PHYSICIAN ASSISTANT

## 2020-01-31 RX ADMIN — HYDROCODONE BITARTRATE AND ACETAMINOPHEN 2 TABLET: 5; 325 TABLET ORAL at 03:26

## 2020-01-31 RX ADMIN — CEFAZOLIN SODIUM 1000 MG: 1 SOLUTION INTRAVENOUS at 08:14

## 2020-01-31 RX ADMIN — PANTOPRAZOLE SODIUM 40 MG: 40 TABLET, DELAYED RELEASE ORAL at 06:03

## 2020-01-31 RX ADMIN — SODIUM CHLORIDE, SODIUM LACTATE, POTASSIUM CHLORIDE, AND CALCIUM CHLORIDE 100 ML/HR: .6; .31; .03; .02 INJECTION, SOLUTION INTRAVENOUS at 06:42

## 2020-01-31 RX ADMIN — CEFAZOLIN SODIUM 1000 MG: 1 SOLUTION INTRAVENOUS at 00:30

## 2020-01-31 RX ADMIN — HYDROCODONE BITARTRATE AND ACETAMINOPHEN 2 TABLET: 5; 325 TABLET ORAL at 11:12

## 2020-01-31 RX ADMIN — DOCUSATE SODIUM 100 MG: 100 CAPSULE, LIQUID FILLED ORAL at 08:14

## 2020-01-31 NOTE — SOCIAL WORK
[facility] is able to accept patient for Home SN/MARINA drain care  Nursing updated and will review with patient at the bedside

## 2020-01-31 NOTE — PROGRESS NOTES
Progress Note - General Surgery   Kelton Hendrix 25 y o  female MRN: [id number]  Unit/Bed#: S -01 Encounter: [id number]        Subjective/Objective   Chief Complaint:     Subjective: pain controlled, ambulating, voiding    Objective: skin well perfused, no hematoma    Assessment:  S/p b/l mastectomy/te recon    Plan:  abx  dvt prophylaxis  Instructions explained  F/u as outpt    Blood pressure 132/69, pulse 77, temperature 99 1 °F (37 3 °C), temperature source Oral, resp  rate 16, height 5' 2" (1 575 m), weight 59 kg (130 lb), last menstrual period 01/17/2020, SpO2 98 %, not currently breastfeeding  ,Body mass index is 23 78 kg/m²        Intake/Output Summary (Last 24 hours) at 1/31/2020 0644  Last data filed at 1/31/2020 0277  Gross per 24 hour   Intake 4200 ml   Output 2158 ml   Net 2042 ml       Invasive Devices     Peripheral Intravenous Line            Peripheral IV 01/30/20 Right Hand less than 1 day          Drain            Closed/Suction Drain Left Breast Bulb 15 Fr  less than 1 day    Closed/Suction Drain Left Breast Bulb 15 Fr  less than 1 day    Closed/Suction Drain Right Breast Bulb 15 Fr  less than 1 day    Closed/Suction Drain Right Breast Bulb 15 Fr  less than 1 day                      Duncan Rivera MD  1/31/2020  6:44 AM

## 2020-01-31 NOTE — DISCHARGE SUMMARY
Discharge Summary - Medical Cristina Hawthorne 25 y o  female MRN: [id number]    [address] Room / Bed: S /S -52 Encounter: [id number]    BRIEF OVERVIEW  Admitting Provider: Malka Navarro MD  Discharge Provider: Malka Navarro MD  Primary Care Physician at Discharge: Cheryle Koh, [address]    Discharge To: Home      Admission Date: 1/30/2020     Discharge Date: No discharge date for patient encounter  Code Status: Level 1 - Full Code  Advance Directive and Living Will: <no information>  Power of :        Primary Discharge Diagnosis  Principal Problem:    BRCA2 gene mutation positive  Resolved Problems:    * No resolved hospital problems   *        Discharge Disposition: [address] [place] [address]    Presenting Problem/History of Present Illness  BRCA2 gene mutation positive      Discharge Condition: stable    Patient tolerated the procedure well, recovered and was discharged home in stable condition    Alla Ceja MD  1/31/2020  6:46 AM

## 2020-01-31 NOTE — NURSING NOTE
Reviewed drain care with patient and her mother  Verbalized and demonstrated understanding   Questions encouraged and answered

## 2020-01-31 NOTE — SOCIAL WORK
CM spoke with patient at the bedside  CM introduced self and role  CM discussed VNA with patient at the bedside  Patient is open to having a SN come to the home for MARINA drain care and teaching  Patient open to a blanket referral for VNA  Patient's address is [address] [place] [address]  Patient provided cell number for VNA to contact, [phone]  Patient stated her sister is a physical therapist and able to help at home  Patient's parents are transporting at discharge  CM will f/u with patient re:available VNA in her area  CM sent blanket referral to VNA in the [place] area  Waiting for response

## 2020-01-31 NOTE — PROGRESS NOTES
Progress Note - General Surgery   Caitlin Bhat 25 y o  female MRN: [id number]  Unit/Bed#: S -01 Encounter: [id number]    Assessment/Plan  Caitlin Bhat is a 25 y o  female     POD#1 s/p bilateral mastectomy w/ reconstruction  BRCA2 mutation    Improved from yesterday evening, pain better controlled  Tolerating diet, no nausea or vomiting   Urinating well  CM for assessment for VNA for drain care  OK for discharge home from surgical standpoint  Maintain follow up appointment with Dr Latasha James as scheduled    Subjective/Objective     Subjective: No acute events overnight     Objective:     Blood pressure 132/69, pulse 77, temperature 99 1 °F (37 3 °C), temperature source Oral, resp  rate 16, height 5' 2" (1 575 m), weight 59 kg (130 lb), last menstrual period 01/17/2020, SpO2 98 %, not currently breastfeeding  ,Body mass index is 23 78 kg/m²        Intake/Output Summary (Last 24 hours) at 1/31/2020 0745  Last data filed at 1/31/2020 4748  Gross per 24 hour   Intake 4200 ml   Output 2547 ml   Net 1653 ml       Invasive Devices     Peripheral Intravenous Line            Peripheral IV 01/30/20 Right Hand less than 1 day          Drain            Closed/Suction Drain Left Breast Bulb 15 Fr  less than 1 day    Closed/Suction Drain Left Breast Bulb 15 Fr  less than 1 day    Closed/Suction Drain Right Breast Bulb 15 Fr  less than 1 day    Closed/Suction Drain Right Breast Bulb 15 Fr  less than 1 day                Physical Exam: /69 (BP Location: Left arm)   Pulse 77   Temp 99 1 °F (37 3 °C) (Oral)   Resp 16   Ht 5' 2" (1 575 m)   Wt 59 kg (130 lb)   LMP 01/17/2020   SpO2 98%   BMI 23 78 kg/m²   General appearance: alert and oriented, in no acute distress  Lungs: clear to auscultation bilaterally  Breasts: Covered with dressing, wrapped and in bra, drains x2 with minimal serosang drainage, no evidence of hematoma, full ROM in upper extremities  Heart: regular rate and rhythm, S1, S2 normal, no murmur, click, rub or gallop  Extremities: extremities normal, warm and well-perfused; no cyanosis, clubbing, or edema    Lab, Imaging and other studies:I have personally reviewed pertinent lab results       VTE Pharmacologic Prophylaxis: Sequential compression device (Venodyne)   VTE Mechanical Prophylaxis: sequential compression device    Recent Results (from the past 36 hour(s))   Urine Pregnancy (Holding Area Only) POCT    Collection Time: 01/30/20 10:54 AM   Result Value Ref Range    EXT Preg Test, Ur Negative Negative    Control Valid Valid

## 2020-01-31 NOTE — RESPIRATORY THERAPY NOTE
RT Protocol Note  Kelton Hendrix 25 y o  female MRN: [id number]  Unit/Bed#: S -01 Encounter: [id number]    Assessment    Principal Problem:    BRCA2 gene mutation positive      Home Pulmonary Medications:  Albuterol mdi prn       Past Medical History:   Diagnosis Date    Anaphylaxis     tree nuts     Asthma     " Exercise Induced "    BRCA positive     Cervical stenosis     Cervical stenosis (uterine cervix)     Dysmenorrhea     GERD (gastroesophageal reflux disease)     Hives     Inguinal mass     right    Irritable bowel     Menorrhagia     Pap smear for cervical cancer screening 05/31/2017    ASCUS, HPV negative    PONV (postoperative nausea and vomiting)     Vitamin D deficiency      Social History     Socioeconomic History    Marital status: Single     Spouse name: None    Number of children: 0    Years of education: None    Highest education level: None   Occupational History    Occupation: Sales      Comment: [employer]    Social Needs    Financial resource strain: None    Food insecurity:     Worry: None     Inability: None    Transportation needs:     Medical: None     Non-medical: None   Tobacco Use    Smoking status: Never Smoker    Smokeless tobacco: Never Used   Substance and Sexual Activity    Alcohol use:  Yes     Alcohol/week: 4 0 standard drinks     Types: 4 Glasses of wine per week     Frequency: 2-3 times a week     Drinks per session: 1 or 2     Binge frequency: Never     Comment: Socially    Drug use: No    Sexual activity: Yes     Birth control/protection: None   Lifestyle    Physical activity:     Days per week: 7 days     Minutes per session: 120 min    Stress: None   Relationships    Social connections:     Talks on phone: None     Gets together: None     Attends Baptism service: None     Active member of club or organization: None     Attends meetings of clubs or organizations: None     Relationship status: None    Intimate partner violence:     Fear of current or ex partner: None     Emotionally abused: None     Physically abused: None     Forced sexual activity: None   Other Topics Concern    None   Social History Narrative    Patient lives in a single dwelling home     [facility] in the bedroom     Home is smoke free     Uses air      No humidifier     No dehumidifier     No basement     Window air conditioning         2 dogs  Dilly and [place] Alexandra and there not allowed in the bedroom         Caffeine:  Does not consume     Chocolate occasional         Patient lives with mom and dad        Subjective         Objective    Physical Exam:   Assessment Type: (P) Assess only  General Appearance: (P) Awake, Alert  Respiratory Pattern: (P) Normal  Chest Assessment: (P) Chest expansion symmetrical  Bilateral Breath Sounds: (P) Clear    Vitals:  Blood pressure 119/68, pulse 80, temperature 98 4 °F (36 9 °C), temperature source Oral, resp  rate 16, height 5' 2" (1 575 m), weight 59 kg (130 lb), last menstrual period 01/17/2020, SpO2 (P) 97 %, not currently breastfeeding  Imaging and other studies: I have personally reviewed pertinent reports  Plan    Respiratory Plan: (P) No distress/Pulmonary history        Resp Comments: (P) Pt assessed per protocol  pt states she has a past pulm hx of asthma and takes albuterol mdi prn at home  Pt states she does not feel short of breath at this time  Scheduled nebs not indicated at this time  Continue albuterol mdi prn  Will continue to monitor

## 2020-01-31 NOTE — PROGRESS NOTES
Post Op Check Note -Surgery QUIRINO Izaguirre Slice 25 y o  female MRN: [id number]  Unit/Bed#: S -01 Encounter: [id number]    ASSESSMENT/PLAN:  Problem List     * (Principal) BRCA2 gene mutation positive    Allergic asthma, mild intermittent, uncomplicated   24 yo F POD#0 s/p bilateral mastectomy for BRCA2 gene mutation positive  Having no relief from Percocet  Will give IV morphine now and change percocet to Vicodin as it has helped her in the past      · Change Percocet to Vicodin  IV morphine for breakthrough  · OOB as tolerated  · Drain care and teaching  · Pain control  · IVF  · CM consult to arrange VNA  Subjective/Objective     Subjective:    Crying in pain  No relief from Percocet  Says when she had her wisdom teeth pulled percocet did not help her   tolerating diet    Objective/Physical Exam:   Blood pressure 119/68, pulse 80, temperature 98 4 °F (36 9 °C), temperature source Oral, resp  rate 16, height 5' 2" (1 575 m), weight 59 kg (130 lb), last menstrual period 01/17/2020, SpO2 96 %, not currently breastfeeding  ,Body mass index is 23 78 kg/m²  General appearance: alert  Chest: Incisions intact  Flat and soft  No evidence of bleeding or hematoma  MARINA drainage lightening up- serosanguinous       Current Facility-Administered Medications:     albuterol (PROVENTIL HFA,VENTOLIN HFA) inhaler 2 puff, 2 puff, Inhalation, Q6H PRN, MD Janis Marcelino  [START ON 1/31/2020] ceFAZolin (ANCEF) IVPB (premix) 1,000 mg, 1,000 mg, Intravenous, Q8H, Marielena Rivera MD    dicyclomine (BENTYL) tablet 20 mg, 20 mg, Oral, BID PRN, Jose Smith MD    diphenhydrAMINE (BENADRYL) tablet 50 mg, 50 mg, Oral, Q6H PRN, Jose Smith MD, 50 mg at 01/30/20 1934    docusate sodium (COLACE) capsule 100 mg, 100 mg, Oral, BID, Marielena Rivera MD, 100 mg at 01/30/20 1935    EPINEPHrine PF (ADRENALIN) 1 mg/mL injection 0 3 mg, 0 3 mg, Intramuscular, PRN, Jose Smith MD    lactated ringers infusion, 125 mL/hr, Intravenous, Continuous, Jerrell Prudent MD Khai, Stopped at 01/30/20 1735    lactated ringers infusion, 100 mL/hr, Intravenous, Continuous, Grayson Joyner MD, Last Rate: 100 mL/hr at 01/30/20 1941, 100 mL/hr at 01/30/20 1941    lactated ringers infusion, 125 mL/hr, Intravenous, Continuous, Ericka Gómez MD    morphine (PF) 4 mg/mL injection 3 mg, 3 mg, Intravenous, Q1H PRN, Grayson Joyner MD    [facility]ron [facility]) injection 4 mg, 4 mg, Intravenous, Q4H PRN, Grayson Joyner MD    oxyCODONE-acetaminophen [facility]) 5-325 mg per tablet 2 tablet, 2 tablet, Oral, Q4H PRN, Grayson Joyner MD, 2 tablet at 01/30/20 1935    [START ON 1/31/2020] pantoprazole (PROTONIX) EC tablet 40 mg, 40 mg, Oral, Early Morning, Marielena Rivera MD    [facility]) tablet 25 mg, 25 mg, Oral, BID PRN, Grayson Joyner MD    VTE Pharmacologic Prophylaxis: Sequential compression device Lavona Nottingham)

## 2020-02-05 ENCOUNTER — TELEPHONE (OUTPATIENT)
Dept: SURGICAL ONCOLOGY | Facility: CLINIC | Age: 25
End: 2020-02-05

## 2020-02-05 NOTE — TELEPHONE ENCOUNTER
Post-Op phone call placed to patient  Patient denies any redness, swelling, or discharge from incision  States that the pain is well controlled  Informed patient that her pathology results came back and only showed benign tissue and fibroadenomas bilaterally  Patient verbalized understanding  Post-Op appointment with Dr Rafael Yee verified for 2/20/2020  Patient denies any questions or concerns at this time  Instructed patient to call the office if any problems arise

## 2020-02-20 ENCOUNTER — OFFICE VISIT (OUTPATIENT)
Dept: SURGICAL ONCOLOGY | Facility: CLINIC | Age: 25
End: 2020-02-20

## 2020-02-20 VITALS
SYSTOLIC BLOOD PRESSURE: 102 MMHG | BODY MASS INDEX: 22.82 KG/M2 | DIASTOLIC BLOOD PRESSURE: 70 MMHG | TEMPERATURE: 98.4 F | RESPIRATION RATE: 16 BRPM | HEIGHT: 62 IN | HEART RATE: 65 BPM | WEIGHT: 124 LBS

## 2020-02-20 DIAGNOSIS — Z90.13 STATUS POST BILATERAL MASTECTOMY: ICD-10-CM

## 2020-02-20 DIAGNOSIS — Z15.01 BRCA2 GENE MUTATION POSITIVE: Primary | ICD-10-CM

## 2020-02-20 DIAGNOSIS — Z15.09 BRCA2 GENE MUTATION POSITIVE: Primary | ICD-10-CM

## 2020-02-20 PROCEDURE — 99024 POSTOP FOLLOW-UP VISIT: CPT | Performed by: SURGERY

## 2020-02-20 NOTE — LETTER
February 20, 2020     Cass Gonzalez, [address] [facility] [address]    Patient: Raisa Stephanie   YOB: 1995   Date of Visit: 2/20/2020       Dear Dr Kathrin Mendoza: Thank you for referring Raisa Scales to me for evaluation  Below are my notes for this consultation  If you have questions, please do not hesitate to call me  I look forward to following your patient along with you  Sincerely,        Alanna Bajwa MD        CC: No Recipients  Alanna Bajwa MD  2/20/2020 10:21 AM  Sign at close encounter     Surgical Oncology Breast Post-Op       [address] [facility]  [facility] [place] [address]    Raisa Scales  1995  [id number]  [address]  [facility] [address] [id number]    Chief Complaint:   Clayton King is seen for a post-operative visit of her recent breast surgery  Oncology History:      No history exists  Assessment & Plan:   Assessment/Plan    The patient presents for a postoperative visit  She has had her 1st expansion from her bilateral mastectomy and reconstruction  She tolerated this well  Her wounds demonstrate no evidence of infection  We will see her back in approximately 3 months  She will see our advanced practitioner at that time  I did explain that at least for the next 1-2 years we would see her every 6 months to every year  But this could be transferred to her gynecologist or PCP  She is followed closely for her BRCA mutation by her gynecologist   History of Present Illness:   See Oncology History    Interval History:   See Assessment & Plan    Review of Systems:   Review of Systems   All other systems reviewed and are negative        Past Medical History:     Patient Active Problem List   Diagnosis    Irritant contact dermatitis due to plant    Irritant contact dermatitis due to plants, except food    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    Angio-edema    Allergic asthma, mild intermittent, uncomplicated    Allergic rhinitis caused by mold    Seasonal allergic rhinitis due to pollen    Allergic rhinitis due to animal (cat) (dog) hair and dander    Acute atopic conjunctivitis, bilateral    Intrinsic eczema    PFAS (pollen-food allergy syndrome), initial encounter    Latex allergy, contact dermatitis    BRCA2 gene mutation positive    Exercise-induced asthma    Seborrhea capitis     Past Medical History:   Diagnosis Date    Anaphylaxis     tree nuts     Asthma     " Exercise Induced "    BRCA positive     Cervical stenosis     Cervical stenosis (uterine cervix)     Dysmenorrhea     GERD (gastroesophageal reflux disease)     Hives     Inguinal mass     right    Irritable bowel     Menorrhagia     Pap smear for cervical cancer screening 05/31/2017    ASCUS, HPV negative    PONV (postoperative nausea and vomiting)     Vitamin D deficiency      Past Surgical History:   Procedure Laterality Date    COLONOSCOPY      CYST REMOVAL Right     Middle Finger    KNEE ARTHROSCOPY Right     KNEE CARTILAGE SURGERY      AK ADJ TISS XFER TRUNK 10 1-30 SQCM Bilateral 1/30/2020    Procedure: TRUNK LOCAL FLAP;  Surgeon: Sheng Alberto MD;  [facility];  Service: Plastics    AK BREAST RECONSTRUC W TISS EXPANDR Bilateral 1/30/2020    Procedure: BREAST INSERTION/PLACEMENT TISSUE EXPANDER (EXCHANGE);   Surgeon: Sheng Alberto MD;  [facility];  Service: Plastics    AK IMPLNT BIO IMPLNT FOR SOFT TISSUE REINFORCEMENT Bilateral 1/30/2020    Procedure: BREAST RECONSTRUCTION WITH IMPLANT;  Surgeon: Sheng Alberto MD;  [facility];  Service: Plastics    AK MASTECTOMY, SIMPLE, COMPLETE Bilateral 1/30/2020    Procedure: BREAST SIMPLE MASTECTOMY;  Surgeon: Cassie Bone MD;  [facility];  Service: Surgical Oncology    US GUIDED BREAST BIOPSY LEFT COMPLETE Left 12/16/2019    US GUIDED BREAST BIOPSY RIGHT COMPLETE Right 12/16/2019    WISDOM TOOTH EXTRACTION Bilateral      Family History   Problem Relation Age of Onset    Ovarian cancer Maternal Grandmother 48    Brain cancer Maternal Grandmother     BRCA 1/2 Mother         Prophylactic surgery    Hypertension Father     Heart disease Father     BRCA 1/2 Brother     Allergies Brother     BRCA 1/2 Maternal Uncle      Social History     Socioeconomic History    Marital status: Single     Spouse name: Not on file    Number of children: 0    Years of education: Not on file    Highest education level: Not on file   Occupational History    Occupation: Sales      Comment: [employer]    Social Needs    Financial resource strain: Not on file    Food insecurity:     Worry: Not on file     Inability: Not on file    Transportation needs:     Medical: Not on file     Non-medical: Not on file   Tobacco Use    Smoking status: Never Smoker    Smokeless tobacco: Never Used   Substance and Sexual Activity    Alcohol use:  Yes     Alcohol/week: 4 0 standard drinks     Types: 4 Glasses of wine per week     Frequency: 2-3 times a week     Drinks per session: 1 or 2     Binge frequency: Never     Comment: Socially    Drug use: No    Sexual activity: Yes     Birth control/protection: None   Lifestyle    Physical activity:     Days per week: 7 days     Minutes per session: 120 min    Stress: Not on file   Relationships    Social connections:     Talks on phone: Not on file     Gets together: Not on file     Attends Anglican service: Not on file     Active member of club or organization: Not on file     Attends meetings of clubs or organizations: Not on file     Relationship status: Not on file    Intimate partner violence:     Fear of current or ex partner: Not on file     Emotionally abused: Not on file     Physically abused: Not on file     Forced sexual activity: Not on file   Other Topics Concern    Not on file   Social History Narrative    Patient lives in a single dwelling home     [place] genesis in the bedroom     Home is smoke free     Uses air      No humidifier     No dehumidifier     No basement     Window air conditioning         2 dogs  Dilly and Morales and there not allowed in the bedroom         Caffeine:  Does not consume     Chocolate occasional         Patient lives with mom and dad        Current Outpatient Medications:     albuterol (VENTOLIN HFA) 90 mcg/act inhaler, Inhale 2 puffs every 6 (six) hours as needed for wheezing, Disp: 18 g, Rfl: 3    saccharomyces boulardii (FLORASTOR) 250 mg capsule, Take 250 mg by mouth 2 (two) times a day, Disp: , Rfl:     Wheat Dextrin (BENEFIBER PO), Take by mouth as needed , Disp: , Rfl:     dicyclomine (BENTYL) 20 mg tablet, Take 1 tablet (20 mg total) by mouth 2 (two) times a day as needed (Abdominal pain) (Patient not taking: Reported on 2/20/2020), Disp: 20 tablet, Rfl: 0    EPINEPHrine (EPIPEN) 0 3 mg/0 3 mL SOAJ, Inject 0 3 mg into a muscle as needed for anaphylaxis, Disp: , Rfl: 1    omeprazole (PriLOSEC) 40 MG capsule, Take 40 mg by mouth as needed, Disp: , Rfl:     ondansetron (ZOFRAN-ODT) 4 mg disintegrating tablet, Take 1 tablet (4 mg total) by mouth every 8 (eight) hours as needed for nausea or vomiting (Patient not taking: Reported on 2/20/2020), Disp: 10 tablet, Rfl: 0  Allergies   Allergen Reactions    Apple Anaphylaxis    Cherry Anaphylaxis    Nuts Anaphylaxis    Peach [Prunus Persica] Anaphylaxis    Latex Hives and Swelling     Localized reaction to Latex    Nsaids Rash    Tolmetin Rash     " Nsaid"       Physical Exams:     Vitals:    02/20/20 1001   BP: 102/70   Pulse: 65   Resp: 16   Temp: 98 4 °F (36 9 °C)     Physical Exam   Pulmonary/Chest:   Examination of both reconstructed breast demonstrated very good cosmetic reconstruction with a small amount of expansion at this point in time there is no evidence of hematoma seroma or infection         Results & Discussion:   Patient is doing well  Her pathology demonstrated no evidence of malignancy or pre malignancy    We will see her back as outlined above

## 2020-02-20 NOTE — PROGRESS NOTES
Surgical Oncology Breast Post-Op       [address] [facility] SURGICAL ONCOLOGY [place]  [address] [facility] [address]    Rashida Silva  1995  [id number]  [address] [facility] SURGICAL ONCOLOGY [place]  [address] [address]    Chief Complaint:   Maria Eugenia Christie is seen for a post-operative visit of her recent breast surgery  Oncology History:      No history exists  Assessment & Plan:   Assessment/Plan    The patient presents for a postoperative visit  She has had her 1st expansion from her bilateral mastectomy and reconstruction  She tolerated this well  Her wounds demonstrate no evidence of infection  We will see her back in approximately 3 months  She will see our advanced practitioner at that time  I did explain that at least for the next 1-2 years we would see her every 6 months to every year  But this could be transferred to her gynecologist or PCP  She is followed closely for her BRCA mutation by her gynecologist   History of Present Illness:   See Oncology History    Interval History:   See Assessment & Plan    Review of Systems:   Review of Systems   All other systems reviewed and are negative        Past Medical History:     Patient Active Problem List   Diagnosis    Irritant contact dermatitis due to plant    Irritant contact dermatitis due to plants, except food    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    Angio-edema    Allergic asthma, mild intermittent, uncomplicated    Allergic rhinitis caused by mold    Seasonal allergic rhinitis due to pollen    Allergic rhinitis due to animal (cat) (dog) hair and dander    Acute atopic conjunctivitis, bilateral    Intrinsic eczema    PFAS (pollen-food allergy syndrome), initial encounter    Latex allergy, contact dermatitis    BRCA2 gene mutation positive    Exercise-induced asthma    Seborrhea capitis     Past Medical History:   Diagnosis Date    Anaphylaxis tree nuts     Asthma     " Exercise Induced "    BRCA positive     Cervical stenosis     Cervical stenosis (uterine cervix)     Dysmenorrhea     GERD (gastroesophageal reflux disease)     Hives     Inguinal mass     right    Irritable bowel     Menorrhagia     Pap smear for cervical cancer screening 05/31/2017    ASCUS, HPV negative    PONV (postoperative nausea and vomiting)     Vitamin D deficiency      Past Surgical History:   Procedure Laterality Date    COLONOSCOPY      CYST REMOVAL Right     Middle Finger    KNEE ARTHROSCOPY Right     KNEE CARTILAGE SURGERY      ID ADJ TISS XFER TRUNK 10 1-30 SQCM Bilateral 1/30/2020    Procedure: TRUNK LOCAL FLAP;  Surgeon: Nini Howell MD;  [facility];  Service: Plastics    ID BREAST RECONSTRUC W TISS EXPANDR Bilateral 1/30/2020    Procedure: BREAST INSERTION/PLACEMENT TISSUE EXPANDER (EXCHANGE);   Surgeon: Nini Howell MD;  [facility];  Service: Plastics    ID IMPLNT BIO IMPLNT FOR SOFT TISSUE REINFORCEMENT Bilateral 1/30/2020    Procedure: BREAST RECONSTRUCTION WITH IMPLANT;  Surgeon: Nini Howell MD;  [facility];  Service: Plastics    ID MASTECTOMY, SIMPLE, COMPLETE Bilateral 1/30/2020    Procedure: BREAST SIMPLE MASTECTOMY;  Surgeon: Yesy Ferreira MD;  [facility];  Service: Surgical Oncology    US GUIDED BREAST BIOPSY LEFT COMPLETE Left 12/16/2019    US GUIDED BREAST BIOPSY RIGHT COMPLETE Right 12/16/2019    WISDOM TOOTH EXTRACTION Bilateral      Family History   Problem Relation Age of Onset    Ovarian cancer Maternal Grandmother 48    Brain cancer Maternal Grandmother     BRCA 1/2 Mother         Prophylactic surgery    Hypertension Father     Heart disease Father     BRCA 1/2 Brother     Allergies Brother     BRCA 1/2 Maternal Uncle      Social History     Socioeconomic History    Marital status: Single     Spouse name: Not on file    Number of children: 0    Years of education: Not on file   Elizabeth Drop Highest education level: Not on file   Occupational History    Occupation: [employer]      Comment: [employer]    Social Needs    Financial resource strain: Not on file    Food insecurity:     Worry: Not on file     Inability: Not on file    Transportation needs:     Medical: Not on file     Non-medical: Not on file   Tobacco Use    Smoking status: Never Smoker    Smokeless tobacco: Never Used   Substance and Sexual Activity    Alcohol use:  Yes     Alcohol/week: 4 0 standard drinks     Types: 4 Glasses of wine per week     Frequency: 2-3 times a week     Drinks per session: 1 or 2     Binge frequency: Never     Comment: Socially    Drug use: No    Sexual activity: Yes     Birth control/protection: None   Lifestyle    Physical activity:     Days per week: 7 days     Minutes per session: 120 min    Stress: Not on file   Relationships    Social connections:     Talks on phone: Not on file     Gets together: Not on file     Attends Pentecostalism service: Not on file     Active member of club or organization: Not on file     Attends meetings of clubs or organizations: Not on file     Relationship status: Not on file    Intimate partner violence:     Fear of current or ex partner: Not on file     Emotionally abused: Not on file     Physically abused: Not on file     Forced sexual activity: Not on file   Other Topics Concern    Not on file   Social History Narrative    Patient lives in a single dwelling home     [facility] genesis in the bedroom     Home is smoke free     Uses air      No humidifier     No dehumidifier     No basement     Window air conditioning         2 dogs  Dilly and Norma Medin and there not allowed in the bedroom         Caffeine:  Does not consume     Chocolate occasional         Patient lives with mom and dad        Current Outpatient Medications:     albuterol (VENTOLIN HFA) 90 mcg/act inhaler, Inhale 2 puffs every 6 (six) hours as needed for wheezing, Disp: 18 g, Rfl: 3    saccharomyces boulardii (FLORASTOR) 250 mg capsule, Take 250 mg by mouth 2 (two) times a day, Disp: , Rfl:     Wheat Dextrin (BENEFIBER PO), Take by mouth as needed , Disp: , Rfl:     dicyclomine (BENTYL) 20 mg tablet, Take 1 tablet (20 mg total) by mouth 2 (two) times a day as needed (Abdominal pain) (Patient not taking: Reported on 2/20/2020), Disp: 20 tablet, Rfl: 0    EPINEPHrine (EPIPEN) 0 3 mg/0 3 mL SOAJ, Inject 0 3 mg into a muscle as needed for anaphylaxis, Disp: , Rfl: 1    omeprazole (PriLOSEC) 40 MG capsule, Take 40 mg by mouth as needed, Disp: , Rfl:     ondansetron (ZOFRAN-ODT) 4 mg disintegrating tablet, Take 1 tablet (4 mg total) by mouth every 8 (eight) hours as needed for nausea or vomiting (Patient not taking: Reported on 2/20/2020), Disp: 10 tablet, Rfl: 0  Allergies   Allergen Reactions    Apple Anaphylaxis    Cherry Anaphylaxis    Nuts Anaphylaxis    Peach [Prunus Persica] Anaphylaxis    Latex Hives and Swelling     Localized reaction to Latex    Nsaids Rash    Tolmetin Rash     " Nsaid"       Physical Exams:     Vitals:    02/20/20 1001   BP: 102/70   Pulse: 65   Resp: 16   Temp: 98 4 °F (36 9 °C)     Physical Exam   Pulmonary/Chest:   Examination of both reconstructed breast demonstrated very good cosmetic reconstruction with a small amount of expansion at this point in time there is no evidence of hematoma seroma or infection  Results & Discussion:   Patient is doing well  Her pathology demonstrated no evidence of malignancy or pre malignancy    We will see her back as outlined above

## 2020-06-13 DIAGNOSIS — Z11.59 SCREENING FOR VIRAL DISEASE: ICD-10-CM

## 2020-06-13 PROCEDURE — U0003 INFECTIOUS AGENT DETECTION BY NUCLEIC ACID (DNA OR RNA); SEVERE ACUTE RESPIRATORY SYNDROME CORONAVIRUS 2 (SARS-COV-2) (CORONAVIRUS DISEASE [COVID-19]), AMPLIFIED PROBE TECHNIQUE, MAKING USE OF HIGH THROUGHPUT TECHNOLOGIES AS DESCRIBED BY CMS-2020-01-R: HCPCS

## 2020-06-14 LAB — SARS-COV-2 RNA SPEC QL NAA+PROBE: NOT DETECTED

## 2020-06-16 ENCOUNTER — ANESTHESIA EVENT (OUTPATIENT)
Dept: PERIOP | Facility: HOSPITAL | Age: 25
End: 2020-06-16
Payer: COMMERCIAL

## 2020-06-18 ENCOUNTER — ANESTHESIA (OUTPATIENT)
Dept: PERIOP | Facility: HOSPITAL | Age: 25
End: 2020-06-18
Payer: COMMERCIAL

## 2020-06-18 ENCOUNTER — HOSPITAL ENCOUNTER (OUTPATIENT)
Facility: HOSPITAL | Age: 25
Setting detail: OUTPATIENT SURGERY
Discharge: HOME | End: 2020-06-18
Attending: PLASTIC SURGERY | Admitting: PLASTIC SURGERY
Payer: COMMERCIAL

## 2020-06-18 VITALS
HEART RATE: 74 BPM | DIASTOLIC BLOOD PRESSURE: 78 MMHG | RESPIRATION RATE: 14 BRPM | OXYGEN SATURATION: 96 % | TEMPERATURE: 97.8 F | SYSTOLIC BLOOD PRESSURE: 112 MMHG

## 2020-06-18 DIAGNOSIS — Z15.01 GENETIC SUSCEPTIBILITY TO MALIGNANT NEOPLASM OF BREAST: ICD-10-CM

## 2020-06-18 DIAGNOSIS — N65.0 DEFORMITY OF RECONSTRUCTED BREAST: ICD-10-CM

## 2020-06-18 DIAGNOSIS — Z11.59 SCREENING FOR VIRAL DISEASE: Primary | ICD-10-CM

## 2020-06-18 LAB
EXT PREGNANCY TEST URINE: NEGATIVE
EXT. CONTROL: NORMAL

## 2020-06-18 PROCEDURE — C1789 PROSTHESIS, BREAST, IMP: HCPCS | Performed by: PLASTIC SURGERY

## 2020-06-18 PROCEDURE — 81025 URINE PREGNANCY TEST: CPT | Performed by: ANESTHESIOLOGY

## 2020-06-18 PROCEDURE — 19342 INSJ/RPLCMT BRST IMPLT SEP D: CPT | Performed by: PHYSICIAN ASSISTANT

## 2020-06-18 PROCEDURE — 88305 TISSUE EXAM BY PATHOLOGIST: CPT | Performed by: PATHOLOGY

## 2020-06-18 PROCEDURE — 19371 PERI-IMPLT CAPSLC BRST COMPL: CPT | Performed by: PHYSICIAN ASSISTANT

## 2020-06-18 DEVICE — IMPLANTABLE DEVICE: Type: IMPLANTABLE DEVICE | Site: BREAST | Status: FUNCTIONAL

## 2020-06-18 RX ORDER — CEFAZOLIN SODIUM 1 G/50ML
1000 SOLUTION INTRAVENOUS ONCE
Status: COMPLETED | OUTPATIENT
Start: 2020-06-18 | End: 2020-06-18

## 2020-06-18 RX ORDER — DEXAMETHASONE SODIUM PHOSPHATE 10 MG/ML
INJECTION, SOLUTION INTRAMUSCULAR; INTRAVENOUS AS NEEDED
Status: DISCONTINUED | OUTPATIENT
Start: 2020-06-18 | End: 2020-06-18 | Stop reason: SURG

## 2020-06-18 RX ORDER — LIDOCAINE HYDROCHLORIDE AND EPINEPHRINE 10; 10 MG/ML; UG/ML
INJECTION, SOLUTION INFILTRATION; PERINEURAL AS NEEDED
Status: DISCONTINUED | OUTPATIENT
Start: 2020-06-18 | End: 2020-06-18 | Stop reason: HOSPADM

## 2020-06-18 RX ORDER — MIDAZOLAM HYDROCHLORIDE 2 MG/2ML
INJECTION, SOLUTION INTRAMUSCULAR; INTRAVENOUS AS NEEDED
Status: DISCONTINUED | OUTPATIENT
Start: 2020-06-18 | End: 2020-06-18 | Stop reason: SURG

## 2020-06-18 RX ORDER — FENTANYL CITRATE 50 UG/ML
INJECTION, SOLUTION INTRAMUSCULAR; INTRAVENOUS AS NEEDED
Status: DISCONTINUED | OUTPATIENT
Start: 2020-06-18 | End: 2020-06-18 | Stop reason: SURG

## 2020-06-18 RX ORDER — ONDANSETRON 2 MG/ML
4 INJECTION INTRAMUSCULAR; INTRAVENOUS ONCE AS NEEDED
Status: DISCONTINUED | OUTPATIENT
Start: 2020-06-18 | End: 2020-06-18 | Stop reason: HOSPADM

## 2020-06-18 RX ORDER — SODIUM CHLORIDE, SODIUM LACTATE, POTASSIUM CHLORIDE, CALCIUM CHLORIDE 600; 310; 30; 20 MG/100ML; MG/100ML; MG/100ML; MG/100ML
INJECTION, SOLUTION INTRAVENOUS CONTINUOUS PRN
Status: DISCONTINUED | OUTPATIENT
Start: 2020-06-18 | End: 2020-06-18 | Stop reason: SURG

## 2020-06-18 RX ORDER — PROPOFOL 10 MG/ML
INJECTION, EMULSION INTRAVENOUS AS NEEDED
Status: DISCONTINUED | OUTPATIENT
Start: 2020-06-18 | End: 2020-06-18 | Stop reason: SURG

## 2020-06-18 RX ORDER — OXYCODONE HYDROCHLORIDE AND ACETAMINOPHEN 5; 325 MG/1; MG/1
1 TABLET ORAL EVERY 4 HOURS PRN
Status: DISCONTINUED | OUTPATIENT
Start: 2020-06-18 | End: 2020-06-18 | Stop reason: HOSPADM

## 2020-06-18 RX ORDER — PROMETHAZINE HYDROCHLORIDE 25 MG/ML
12.5 INJECTION, SOLUTION INTRAMUSCULAR; INTRAVENOUS ONCE
Status: DISCONTINUED | OUTPATIENT
Start: 2020-06-18 | End: 2020-06-18 | Stop reason: HOSPADM

## 2020-06-18 RX ORDER — FENTANYL CITRATE/PF 50 MCG/ML
25 SYRINGE (ML) INJECTION
Status: DISCONTINUED | OUTPATIENT
Start: 2020-06-18 | End: 2020-06-18 | Stop reason: HOSPADM

## 2020-06-18 RX ORDER — BUPIVACAINE HYDROCHLORIDE AND EPINEPHRINE 2.5; 5 MG/ML; UG/ML
INJECTION, SOLUTION EPIDURAL; INFILTRATION; INTRACAUDAL; PERINEURAL AS NEEDED
Status: DISCONTINUED | OUTPATIENT
Start: 2020-06-18 | End: 2020-06-18 | Stop reason: HOSPADM

## 2020-06-18 RX ORDER — GLYCOPYRROLATE 0.2 MG/ML
INJECTION INTRAMUSCULAR; INTRAVENOUS AS NEEDED
Status: DISCONTINUED | OUTPATIENT
Start: 2020-06-18 | End: 2020-06-18 | Stop reason: SURG

## 2020-06-18 RX ORDER — KETAMINE HYDROCHLORIDE 50 MG/ML
INJECTION, SOLUTION, CONCENTRATE INTRAMUSCULAR; INTRAVENOUS AS NEEDED
Status: DISCONTINUED | OUTPATIENT
Start: 2020-06-18 | End: 2020-06-18 | Stop reason: SURG

## 2020-06-18 RX ORDER — OXYCODONE HYDROCHLORIDE AND ACETAMINOPHEN 5; 325 MG/1; MG/1
2 TABLET ORAL EVERY 4 HOURS PRN
Status: CANCELLED | OUTPATIENT
Start: 2020-06-18

## 2020-06-18 RX ORDER — LIDOCAINE HYDROCHLORIDE 10 MG/ML
INJECTION, SOLUTION EPIDURAL; INFILTRATION; INTRACAUDAL; PERINEURAL AS NEEDED
Status: DISCONTINUED | OUTPATIENT
Start: 2020-06-18 | End: 2020-06-18 | Stop reason: SURG

## 2020-06-18 RX ORDER — LIDOCAINE HYDROCHLORIDE 10 MG/ML
0.5 INJECTION, SOLUTION EPIDURAL; INFILTRATION; INTRACAUDAL; PERINEURAL ONCE AS NEEDED
Status: DISCONTINUED | OUTPATIENT
Start: 2020-06-18 | End: 2020-06-18 | Stop reason: HOSPADM

## 2020-06-18 RX ORDER — PROPOFOL 10 MG/ML
INJECTION, EMULSION INTRAVENOUS CONTINUOUS PRN
Status: DISCONTINUED | OUTPATIENT
Start: 2020-06-18 | End: 2020-06-18 | Stop reason: SURG

## 2020-06-18 RX ORDER — ONDANSETRON 2 MG/ML
INJECTION INTRAMUSCULAR; INTRAVENOUS AS NEEDED
Status: DISCONTINUED | OUTPATIENT
Start: 2020-06-18 | End: 2020-06-18 | Stop reason: SURG

## 2020-06-18 RX ORDER — SODIUM CHLORIDE, SODIUM LACTATE, POTASSIUM CHLORIDE, CALCIUM CHLORIDE 600; 310; 30; 20 MG/100ML; MG/100ML; MG/100ML; MG/100ML
125 INJECTION, SOLUTION INTRAVENOUS CONTINUOUS
Status: DISCONTINUED | OUTPATIENT
Start: 2020-06-18 | End: 2020-06-18 | Stop reason: HOSPADM

## 2020-06-18 RX ADMIN — KETAMINE HYDROCHLORIDE 20 MG: 50 INJECTION INTRAMUSCULAR; INTRAVENOUS at 08:40

## 2020-06-18 RX ADMIN — MIDAZOLAM HYDROCHLORIDE 2 MG: 1 INJECTION, SOLUTION INTRAMUSCULAR; INTRAVENOUS at 07:42

## 2020-06-18 RX ADMIN — DEXAMETHASONE SODIUM PHOSPHATE 4 MG: 10 INJECTION, SOLUTION INTRAMUSCULAR; INTRAVENOUS at 08:07

## 2020-06-18 RX ADMIN — PROPOFOL 150 MCG/KG/MIN: 10 INJECTION, EMULSION INTRAVENOUS at 07:49

## 2020-06-18 RX ADMIN — GLYCOPYRROLATE 0.1 MG: 0.2 INJECTION, SOLUTION INTRAMUSCULAR; INTRAVENOUS at 08:09

## 2020-06-18 RX ADMIN — FENTANYL CITRATE 25 MCG: 50 INJECTION INTRAMUSCULAR; INTRAVENOUS at 10:08

## 2020-06-18 RX ADMIN — CEFAZOLIN SODIUM 1000 MG: 1 SOLUTION INTRAVENOUS at 07:45

## 2020-06-18 RX ADMIN — FENTANYL CITRATE 50 MCG: 50 INJECTION INTRAMUSCULAR; INTRAVENOUS at 07:58

## 2020-06-18 RX ADMIN — LIDOCAINE HYDROCHLORIDE 50 MG: 10 INJECTION, SOLUTION EPIDURAL; INFILTRATION; INTRACAUDAL; PERINEURAL at 07:52

## 2020-06-18 RX ADMIN — PROPOFOL 180 MG: 10 INJECTION, EMULSION INTRAVENOUS at 07:52

## 2020-06-18 RX ADMIN — SODIUM CHLORIDE, SODIUM LACTATE, POTASSIUM CHLORIDE, AND CALCIUM CHLORIDE: .6; .31; .03; .02 INJECTION, SOLUTION INTRAVENOUS at 07:08

## 2020-06-18 RX ADMIN — SODIUM CHLORIDE, SODIUM LACTATE, POTASSIUM CHLORIDE, AND CALCIUM CHLORIDE: .6; .31; .03; .02 INJECTION, SOLUTION INTRAVENOUS at 08:05

## 2020-06-18 RX ADMIN — ONDANSETRON 4 MG: 2 INJECTION INTRAMUSCULAR; INTRAVENOUS at 09:32

## 2020-06-18 RX ADMIN — PROPOFOL 20 MG: 10 INJECTION, EMULSION INTRAVENOUS at 09:25

## 2020-06-18 RX ADMIN — FENTANYL CITRATE 50 MCG: 50 INJECTION INTRAMUSCULAR; INTRAVENOUS at 08:45

## 2020-06-18 RX ADMIN — FENTANYL CITRATE 50 MCG: 50 INJECTION INTRAMUSCULAR; INTRAVENOUS at 09:25

## 2020-06-18 RX ADMIN — KETAMINE HYDROCHLORIDE 20 MG: 50 INJECTION INTRAMUSCULAR; INTRAVENOUS at 07:49

## 2020-06-18 NOTE — DISCHARGE INSTRUCTIONS
1 [facility], [address] [facility], [address] [facility], [facility], [address] [phone]/A / [email or web address]  [email or web address]       No heavy lifting >20 pounds    Do not raise hands above head    No exercise    Consider using button up shirts so you don't have to raise your hands above your head to put the shirt on    Wear the surgical bra at all times except the shower   If the bra is tight and is leaving marks on the skin unclasp the bottom clasps of the bra    No ice or heating pack to chest    Ok to shower    No bathing    Do not lay on stomach or sides    No smoking    No pushing or pulling    No repetitive arm movements such as cleaning, gardening etc    Call the office for an appointment in 5-14 days - [phone]

## 2020-06-18 NOTE — OP NOTE
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 [Z15 01]    Procedure(s) (LRB):  BREAST CAPSULECTOMIES; EXPANDER/IMPLANT EXCHANGE (Bilateral)    Specimen(s):  * No specimens in log *    Estimated Blood Loss:   Minimal    Drains:  Closed/Suction Drain Right Breast Bulb 15 Fr  (Active)   Number of days: 140       Closed/Suction Drain Left Breast Bulb 15 Fr  (Active)   Number of days: 140       Closed/Suction Drain Left Breast Bulb 15 Fr  (Active)   Number of days: 140       [REMOVED] Closed/Suction Drain Right Breast Bulb 15 Fr  (Removed)   Number of days: 139       Anesthesia Type:   General    Operative Indications:  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]  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 [Z15 01]      Operative Findings:      Complications:   None    Procedure and Technique:  The patient was marked while standing prior to surgery  The patient was brought to the operating room and placed supine on the operating room table  Time out procedure was performed, SCDs were applied and IV antibiotics were given  The chest was prepped and draped using standard surgical technique  Attention was turned to the right breast  The previous scar was excised and dissection was carried down to the capsule using electrocautery  The capsule was found to be very thick and stiff  The capsule was opened and the fluid was removed from the expander with a juan carlos suction  The expander was removed  Due to the thick capsule the decision was made to perform a capsulectomy   A total capsulectomy was performed   The capsule was sent as a specimen  The pocket was opened to fit the exact dimensions of the chosen implant  Attention was turned to the left breast  The previous scar was excised and dissection was carried down to the capsule using electrocautery  The capsule was found to be very thick and stiff  The capsule was opened and the fluid was removed from the expander with a juan carlos suction  The expander was removed  Due to the thick capsule the decision was made to perform a capsulectomy  A total capsulectomy was performed   The capsule was sent as a specimen  The pocket was opened to fit the exact dimensions of the chosen implant  The pockets were irrigated with antibiotic solution which was allowed to dwell for 5 minutes  Excellent hemostasis was achieved  The skin was re prepped, all surgical team members changed their gloves  Silicone sizers were prepared  The sizers were placed into the subpectoral pockets and excellent shape and contour was confirmed  Fcoe 252 cohesive silicone implants were placed into the subpectoral pockets  The orientation markers were used to confirm anatomic position of the implant  The deep tissue was re approximated using 2-0 PDS suture in running technique  The skin was closed using 3-0 vicryl and 3-0 PDS suture in interrupted deep dermal technique  The superficial skin was closed using 3-0 stratafix suture in running subcuticular technique  The wounds were cleaned and dried  Skin glue was applied  Sterile gauze and a surgical bra was placed  The pa assisted with obtaining hemostasis and retracting  The patient tolerated the procedure well and was taken to the recovery room in stable condition         I was present for the entire procedure and A qualified resident physician was not available    Patient Disposition:  hemodynamically stable and extubated and stable    SIGNATURE: Titus Agustin MD  DATE: June 18, 2020  TIME: 0915

## 2020-06-18 NOTE — DISCHARGE SUMMARY
Discharge Summary - Medical Sofia Vivas 25 y o  female MRN: [id number]    [address] Room / Bed: OR POOL/OR POOL Encounter: [id number]    BRIEF OVERVIEW  Admitting Provider: Rodrick Brown MD  Discharge Provider: Rodrick Brown MD  Primary Care Physician at Discharge: Sven Calvillo    Discharge To: Home      Admission Date: 6/18/2020     Discharge Date: No discharge date for patient encounter  Code Status: Prior  Advance Directive and Living Will: <no information>  Power of :        Primary Discharge Diagnosis  Active Problems:    * No active hospital problems  *  Resolved Problems:    * No resolved hospital problems   *        Discharge Disposition: [address] [place] [address]    Presenting Problem/History of Present Illness  <principal problem not specified>      Discharge Condition: stable    Patient tolerated the procedure well, recovered and was discharged home in stable condition    Rodrick Brown MD  6/18/2020  7:48 AM

## 2020-07-13 ENCOUNTER — TELEPHONE (OUTPATIENT)
Dept: SURGICAL ONCOLOGY | Facility: CLINIC | Age: 25
End: 2020-07-13

## 2020-07-13 NOTE — TELEPHONE ENCOUNTER
Patient called to reschedule her appointment that she had cancelled via [address]  She did not want the first few times/Days available and was working so she didn't have time to wait and reschedule appointment   I asked if she wanted to call back when she had more time to reschedule and she said "No, if you want me to reschedule you can call me back!"

## 2020-07-21 NOTE — ANESTHESIA PROCEDURE NOTES
Peripheral Block    Staffing  Anesthesiologist: Gatito Ness MD  Performed: anesthesiologist   Preanesthetic Checklist  Completed: patient identified, site marked, surgical consent, pre-op evaluation, timeout performed, IV checked, risks and benefits discussed and monitors and equipment checked  Peripheral Block  Patient position: supine  Prep: ChloraPrep  Anesthesia block type: PECS 1 and PECS 2  Laterality: bilateral  Injection technique: single-shot  Procedures: ultrasound guided, Ultrasound guidance required for the procedure to increase accuracy and safety of medication placement and decrease risk of complications    Ultrasound permanent image saved  Needle  Needle type: Stimuplex   Needle gauge: 25 G  Needle localization: ultrasound guidance  Assessment  Injection assessment: incremental injection, local visualized surrounding nerve on ultrasound, negative aspiration for CSF and negative aspiration for heme  Heart rate change: no  Slow fractionated injection: yes  patient tolerated the procedure well with no immediate complications  Additional Notes  Left side: Khai  Right side: [place]    Each side: 10 cc Exparel, 15 cc 0 25 bupivacaine, 5 cc saline
Female

## 2020-08-12 ENCOUNTER — ANNUAL EXAM (OUTPATIENT)
Dept: GYNECOLOGY | Facility: CLINIC | Age: 25
End: 2020-08-12
Payer: COMMERCIAL

## 2020-08-12 VITALS
WEIGHT: 139 LBS | DIASTOLIC BLOOD PRESSURE: 70 MMHG | SYSTOLIC BLOOD PRESSURE: 104 MMHG | HEIGHT: 62 IN | BODY MASS INDEX: 25.58 KG/M2 | HEART RATE: 62 BPM

## 2020-08-12 DIAGNOSIS — Z15.01 BRCA GENE MUTATION POSITIVE: ICD-10-CM

## 2020-08-12 DIAGNOSIS — Z15.09 BRCA GENE MUTATION POSITIVE: ICD-10-CM

## 2020-08-12 DIAGNOSIS — B37.9 CANDIDIASIS: Primary | ICD-10-CM

## 2020-08-12 PROCEDURE — 99213 OFFICE O/P EST LOW 20 MIN: CPT | Performed by: OBSTETRICS & GYNECOLOGY

## 2020-08-12 RX ORDER — CLOTRIMAZOLE AND BETAMETHASONE DIPROPIONATE 10; .64 MG/G; MG/G
CREAM TOPICAL 2 TIMES DAILY
Qty: 30 G | Refills: 0 | Status: SHIPPED | OUTPATIENT
Start: 2020-08-12 | End: 2020-10-05

## 2020-08-12 NOTE — PROGRESS NOTES
Assessment/Plan:         Diagnoses and all orders for this visit:    Candidiasis  -     terconazole (TERAZOL 7) 0 4 % vaginal cream; Insert 1 applicator into the vagina daily at bedtime for 7 days  -     clotrimazole-betamethasone (LOTRISONE) 1-0 05 % cream; Apply topically 2 (two) times a day        Subjective:      Patient ID: Lindsay Dorantes is a 22 y o  female  HPI  patient presents to the office complaining of vaginal irritation burning soreness at the introitus  She recently on antibiotics for an upper respiratory tract infection  She patient is status post bilateral mastectomy secondary to BRCA positive  No fever or abdomninal pain  The following portions of the patient's history were reviewed and updated as appropriate:  Or Byetta her Aviane  She  has a past medical history of Anaphylaxis, Asthma, BRCA positive, Cervical stenosis, Cervical stenosis (uterine cervix), Dysmenorrhea, GERD (gastroesophageal reflux disease), Hives, Inguinal mass, Irritable bowel, Menorrhagia, Pap smear for cervical cancer screening (05/31/2017), PONV (postoperative nausea and vomiting), and Vitamin D deficiency    She   Patient Active Problem List    Diagnosis Date Noted    Seborrhea capitis 10/19/2019    BRCA2 gene mutation positive 09/30/2019    Irritant contact dermatitis due to plants, except food 03/23/2019    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 03/23/2019    Angio-edema 03/23/2019    Allergic asthma, mild intermittent, uncomplicated 79/98/0062    Allergic rhinitis caused by mold 03/23/2019    Seasonal allergic rhinitis due to pollen 03/23/2019    Allergic rhinitis due to animal (cat) (dog) hair and dander 03/23/2019    Acute atopic conjunctivitis, bilateral 03/23/2019    Intrinsic eczema 03/23/2019    PFAS (pollen-food allergy syndrome), initial encounter 03/23/2019    Latex allergy, contact dermatitis 03/23/2019    Irritant contact dermatitis due to plant 03/21/2019    Exercise-induced asthma 05/28/2015     She  has a past surgical history that includes [place] tooth extraction (Bilateral); Knee cartilage surgery; US guided breast biopsy right complete (Right, 12/16/2019); US guided breast biopsy left complete (Left, 12/16/2019); Knee arthroscopy (Right); Colonoscopy; Cyst Removal (Right); pr mastectomy, simple, complete (Bilateral, 1/30/2020); pr breast reconstruc w tiss expandr (Bilateral, 1/30/2020); pr implnt bio implnt for soft tissue reinforcement (Bilateral, 1/30/2020); pr adj tiss xfer trunk 10 1-30 sqcm (Bilateral, 1/30/2020); pr delay breast pros after breast surg (Bilateral, 6/18/2020); and Mastectomy (Bilateral)  Her family history includes Allergies in her brother; BRCA 1/2 in her brother, maternal uncle, and mother; Brain cancer in her maternal grandmother; Heart disease in her father; Hypertension in her father; Ovarian cancer (age of onset: 48) in her maternal grandmother  She  reports that she has never smoked  She has never used smokeless tobacco  She reports current alcohol use of about 4 0 standard drinks of alcohol per week  She reports that she does not use drugs    Current Outpatient Medications   Medication Sig Dispense Refill    albuterol (Ventolin HFA) 90 mcg/act inhaler Inhale 2 puffs every 6 (six) hours as needed for wheezing 18 g 3    amoxicillin (AMOXIL) 500 mg capsule Take 1 capsule (500 mg total) by mouth every 12 (twelve) hours for 14 days 28 capsule 0    clotrimazole-betamethasone (LOTRISONE) 1-0 05 % cream Apply topically 2 (two) times a day 30 g 0    dicyclomine (BENTYL) 20 mg tablet Take 20 mg by mouth every 6 (six) hours      EPINEPHrine (EPIPEN) 0 3 mg/0 3 mL SOAJ Inject 0 3 mg into a muscle as needed for anaphylaxis  1    Iodine, Kelp, (KELP PO) Take by mouth      omeprazole (PriLOSEC) 40 MG capsule Take 40 mg by mouth as needed      saccharomyces boulardii (FLORASTOR) 250 mg capsule Take 250 mg by mouth 2 (two) times a day      terconazole (TERAZOL 7) 0 4 % vaginal cream Insert 1 applicator into the vagina daily at bedtime for 7 days 35 g 0    Wheat Dextrin (BENEFIBER PO) Take by mouth as needed        No current facility-administered medications for this visit  Current Outpatient Medications on File Prior to Visit   Medication Sig    albuterol (Ventolin HFA) 90 mcg/act inhaler Inhale 2 puffs every 6 (six) hours as needed for wheezing    amoxicillin (AMOXIL) 500 mg capsule Take 1 capsule (500 mg total) by mouth every 12 (twelve) hours for 14 days    dicyclomine (BENTYL) 20 mg tablet Take 20 mg by mouth every 6 (six) hours    EPINEPHrine (EPIPEN) 0 3 mg/0 3 mL SOAJ Inject 0 3 mg into a muscle as needed for anaphylaxis    Iodine, Kelp, (KELP PO) Take by mouth    omeprazole (PriLOSEC) 40 MG capsule Take 40 mg by mouth as needed    saccharomyces boulardii (FLORASTOR) 250 mg capsule Take 250 mg by mouth 2 (two) times a day    Wheat Dextrin (BENEFIBER PO) Take by mouth as needed      No current facility-administered medications on file prior to visit  She is allergic to apple; cherry; nuts; peach [prunus persica]; latex; nsaids; and tolmetin       Review of Systems   Genitourinary:        See HPI         Objective:      /70 (BP Location: Left arm, Patient Position: Sitting, Cuff Size: Standard)   Pulse 62   Ht 5' 2" (1 575 m)   Wt 63 kg (139 lb)   BMI 25 42 kg/m²          Physical Exam  Genitourinary:     Comments: Uterus anteverted normal size and contour freely mobile and nontender  There are no palpable adnexal masses or tenderness  Labia are erythematous and indurated  There is a thick white vaginal discharge  Wet mount was obtained:  Hyphae noted

## 2020-09-02 ENCOUNTER — ULTRASOUND (OUTPATIENT)
Dept: GYNECOLOGY | Facility: CLINIC | Age: 25
End: 2020-09-02
Payer: COMMERCIAL

## 2020-09-02 ENCOUNTER — HOSPITAL ENCOUNTER (OUTPATIENT)
Facility: HOSPITAL | Age: 25
Setting detail: OUTPATIENT SURGERY
Discharge: HOME | End: 2020-09-02
Attending: PLASTIC SURGERY | Admitting: PLASTIC SURGERY
Payer: COMMERCIAL

## 2020-09-02 ENCOUNTER — ANNUAL EXAM (OUTPATIENT)
Dept: GYNECOLOGY | Facility: CLINIC | Age: 25
End: 2020-09-02
Payer: COMMERCIAL

## 2020-09-02 VITALS
HEART RATE: 62 BPM | DIASTOLIC BLOOD PRESSURE: 70 MMHG | HEIGHT: 62 IN | BODY MASS INDEX: 24.84 KG/M2 | WEIGHT: 135 LBS | SYSTOLIC BLOOD PRESSURE: 104 MMHG

## 2020-09-02 DIAGNOSIS — Z01.419 ENCOUNTER FOR GYNECOLOGICAL EXAMINATION WITH PAPANICOLAOU SMEAR OF CERVIX: Primary | ICD-10-CM

## 2020-09-02 DIAGNOSIS — Z15.09 BRCA GENE MUTATION POSITIVE IN FEMALE: Primary | ICD-10-CM

## 2020-09-02 DIAGNOSIS — Z15.01 BRCA GENE MUTATION POSITIVE IN FEMALE: Primary | ICD-10-CM

## 2020-09-02 DIAGNOSIS — Z15.09 BRCA2 GENE MUTATION POSITIVE: ICD-10-CM

## 2020-09-02 DIAGNOSIS — Z15.02 BRCA GENE MUTATION POSITIVE IN FEMALE: Primary | ICD-10-CM

## 2020-09-02 DIAGNOSIS — Z15.01 BRCA2 GENE MUTATION POSITIVE: ICD-10-CM

## 2020-09-02 PROCEDURE — S0612 ANNUAL GYNECOLOGICAL EXAMINA: HCPCS | Performed by: OBSTETRICS & GYNECOLOGY

## 2020-09-02 PROCEDURE — G0145 SCR C/V CYTO,THINLAYER,RESCR: HCPCS | Performed by: OBSTETRICS & GYNECOLOGY

## 2020-09-02 PROCEDURE — 76830 TRANSVAGINAL US NON-OB: CPT | Performed by: OBSTETRICS & GYNECOLOGY

## 2020-09-02 NOTE — PROGRESS NOTES
Assessment/Plan:         Diagnoses and all orders for this visit:    Encounter for gynecological examination with Papanicolaou smear of cervix  -     Liquid-based pap, screening    BRCA2 gene mutation positive; ovaries appear normal on transvaginal scan   pending        Subjective:      Patient ID: Antoine Mabry is a 22 y o  female  HPI  patient presents for annual examination and ultrasound  Patient is brac positive  She is status post bilateral mastectomy  Her menses are regular  Denies any vaginal irritation, burning, discharge  Denies any abdominal pain  Denies any dysuria, hematuria urgency or stress urinary incontinence  No GI complaints  The following portions of the patient's history were reviewed and updated as appropriate:   She  has a past medical history of Anaphylaxis, Asthma, BRCA positive, Cervical stenosis, Cervical stenosis (uterine cervix), Dysmenorrhea, GERD (gastroesophageal reflux disease), Hives, Inguinal mass, Irritable bowel, Menorrhagia, Pap smear for cervical cancer screening (05/31/2017), PONV (postoperative nausea and vomiting), and Vitamin D deficiency    She   Patient Active Problem List    Diagnosis Date Noted    Seborrhea capitis 10/19/2019    BRCA2 gene mutation positive 09/30/2019    Irritant contact dermatitis due to plants, except food 03/23/2019    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 03/23/2019    Angio-edema 03/23/2019    Allergic asthma, mild intermittent, uncomplicated 43/48/6944    Allergic rhinitis caused by mold 03/23/2019    Seasonal allergic rhinitis due to pollen 03/23/2019    Allergic rhinitis due to animal (cat) (dog) hair and dander 03/23/2019    Acute atopic conjunctivitis, bilateral 03/23/2019    Intrinsic eczema 03/23/2019    PFAS (pollen-food allergy syndrome), initial encounter 03/23/2019    Latex allergy, contact dermatitis 03/23/2019    Irritant contact dermatitis due to plant 03/21/2019    Exercise-induced asthma 05/28/2015 She  has a past surgical history that includes [place] tooth extraction (Bilateral); Knee cartilage surgery; US guided breast biopsy right complete (Right, 12/16/2019); US guided breast biopsy left complete (Left, 12/16/2019); Knee arthroscopy (Right); Colonoscopy; Cyst Removal (Right); pr mastectomy, simple, complete (Bilateral, 1/30/2020); pr breast reconstruc w tiss expandr (Bilateral, 1/30/2020); pr implnt bio implnt for soft tissue reinforcement (Bilateral, 1/30/2020); pr adj tiss xfer trunk 10 1-30 sqcm (Bilateral, 1/30/2020); pr delay breast pros after breast surg (Bilateral, 6/18/2020); and Mastectomy (Bilateral)  Her family history includes Allergies in her brother; BRCA 1/2 in her brother, maternal uncle, and mother; Brain cancer in her maternal grandmother; Heart disease in her father; Hypertension in her father; Ovarian cancer (age of onset: 48) in her maternal grandmother  She  reports that she has never smoked  She has never used smokeless tobacco  She reports current alcohol use of about 4 0 standard drinks of alcohol per week  She reports that she does not use drugs  Current Outpatient Medications   Medication Sig Dispense Refill    albuterol (Ventolin HFA) 90 mcg/act inhaler Inhale 2 puffs every 6 (six) hours as needed for wheezing 18 g 3    clotrimazole-betamethasone (LOTRISONE) 1-0 05 % cream Apply topically 2 (two) times a day 30 g 0    dicyclomine (BENTYL) 20 mg tablet Take 20 mg by mouth every 6 (six) hours      EPINEPHrine (EPIPEN) 0 3 mg/0 3 mL SOAJ Inject 0 3 mg into a muscle as needed for anaphylaxis  1    Iodine, Kelp, (KELP PO) Take by mouth      omeprazole (PriLOSEC) 40 MG capsule Take 40 mg by mouth as needed      saccharomyces boulardii (FLORASTOR) 250 mg capsule Take 250 mg by mouth 2 (two) times a day      Wheat Dextrin (BENEFIBER PO) Take by mouth as needed        No current facility-administered medications for this visit           Review of Systems   Constitutional: Negative  HENT: Negative for sore throat and trouble swallowing  Gastrointestinal: Negative  Genitourinary: Negative  Objective:      /70   Pulse 62   Ht 5' 2" (1 575 m)   Wt 61 2 kg (135 lb)   BMI 24 69 kg/m²          Physical Exam  Vitals signs reviewed  Constitutional:       Appearance: Normal appearance  She is normal weight  Neck:      Musculoskeletal: Normal range of motion and neck supple  No muscular tenderness  Cardiovascular:      Rate and Rhythm: Normal rate and regular rhythm  Pulses: Normal pulses  Heart sounds: Normal heart sounds  No murmur  Pulmonary:      Effort: Pulmonary effort is normal  No respiratory distress  Breath sounds: Normal breath sounds  Chest:      Comments: Is status post bilateral mastectomy with reconstruction  No skin changes no adenopathy  Abdominal:      General: Abdomen is flat  Bowel sounds are normal  There is no distension  Palpations: Abdomen is soft  There is no mass  Tenderness: There is no abdominal tenderness  There is no guarding or rebound  Hernia: No hernia is present  There is no hernia in the left inguinal area or right inguinal area  Genitourinary:     General: Normal vulva  Labia:         Right: No rash, tenderness or lesion  Left: No rash, tenderness or lesion  Vagina: Normal       Cervix: Normal       Uterus: Normal        Adnexa: Right adnexa normal and left adnexa normal    Lymphadenopathy:      Cervical: No cervical adenopathy  Lower Body: No right inguinal adenopathy  No left inguinal adenopathy  Neurological:      Mental Status: She is alert           BRCA gene mutation positive in female [Z15 01, Z15 02, Z15 09]       AMB US Pelvic Non OB   Date/Time: 9/2/2020 9:34 AM  Performed by: Nasra Douglas  Authorized by: Naye Benson DO      Procedure details:     Technique:  Transvaginal US, Non-OB    Position: lithotomy exam    Uterine findings:     Length (cm): 7 87    Height (cm):  4 02    Width (cm):  5 32    Endometrial stripe: identified      Endometrium thickness (mm):  8 1  Left ovary findings:     Left ovary:  Visualized    Length (cm): 3    Height (cm): 1 65    Width (cm): 3 07  Right ovary findings:     Right ovary:  Visualized    Length (cm): 2 78    Height (cm): 1 83    Width (cm): 1 39  Other findings:     Free pelvic fluid: not identified    Post-Procedure Details:     Impression:  Retroverted uterus appears within normal limits  The bilateral ovaries appear within normal limits; each containing a follicle-RT 0 4JX and LT 1 8cm  No free fluid  Visualization of right adnexa is limited due to excessive bowel gas present obscuring the area  Tolerance:   Tolerated well, no immediate complications    Complications: no complications

## 2020-09-02 NOTE — PROGRESS NOTES
AMB US Pelvic Non OB    Date/Time: 9/2/2020 9:34 AM  Performed by: Francis Wilson  Authorized by: Jacob Woods DO     Procedure details:     Technique:  Transvaginal US, Non-OB    Position: lithotomy exam    Uterine findings:     Length (cm): 7 87    Height (cm):  4 02    Width (cm):  5 32    Endometrial stripe: identified      Endometrium thickness (mm):  8 1  Left ovary findings:     Left ovary:  Visualized    Length (cm): 3    Height (cm): 1 65    Width (cm): 3 07  Right ovary findings:     Right ovary:  Visualized    Length (cm): 2 78    Height (cm): 1 83    Width (cm): 1 39  Other findings:     Free pelvic fluid: not identified    Post-Procedure Details:     Impression:  Retroverted uterus appears within normal limits  The bilateral ovaries appear within normal limits; each containing a follicle-RT 0 1TH and LT 1 8cm  No free fluid  Visualization of right adnexa is limited due to excessive bowel gas present obscuring the area  Tolerance: Tolerated well, no immediate complications    Complications: no complications    Additional Procedure Comments:      TG [employer]iq P5 transvaginal transducer E8C with Serial Number [id number] was used during procedure and subsequently cleaned with high level disinfection utilizing the [employer]       Limited exam      Ultrasound performed at:     [facility] for [address]  [address], [address]  Phone: [phone]  Fax:  [phone]

## 2020-09-10 LAB
LAB AP GYN PRIMARY INTERPRETATION: NORMAL
Lab: NORMAL

## 2020-10-21 ENCOUNTER — ANESTHESIA EVENT (OUTPATIENT)
Dept: PERIOP | Facility: HOSPITAL | Age: 25
End: 2020-10-21
Payer: COMMERCIAL

## 2020-10-22 ENCOUNTER — ANESTHESIA (OUTPATIENT)
Dept: PERIOP | Facility: HOSPITAL | Age: 25
End: 2020-10-22
Payer: COMMERCIAL

## 2020-10-22 VITALS
SYSTOLIC BLOOD PRESSURE: 112 MMHG | WEIGHT: 139 LBS | OXYGEN SATURATION: 99 % | DIASTOLIC BLOOD PRESSURE: 62 MMHG | HEART RATE: 72 BPM | HEIGHT: 62 IN | BODY MASS INDEX: 25.58 KG/M2 | RESPIRATION RATE: 20 BRPM | TEMPERATURE: 97.5 F

## 2020-10-22 VITALS — HEART RATE: 88 BPM

## 2020-10-22 LAB
EXT PREGNANCY TEST URINE: NEGATIVE
EXT. CONTROL: NORMAL

## 2020-10-22 PROCEDURE — 81025 URINE PREGNANCY TEST: CPT | Performed by: STUDENT IN AN ORGANIZED HEALTH CARE EDUCATION/TRAINING PROGRAM

## 2020-10-22 RX ORDER — FENTANYL CITRATE/PF 50 MCG/ML
50 SYRINGE (ML) INJECTION
Status: CANCELLED | OUTPATIENT
Start: 2020-10-22

## 2020-10-22 RX ORDER — ONDANSETRON 2 MG/ML
4 INJECTION INTRAMUSCULAR; INTRAVENOUS ONCE AS NEEDED
Status: DISCONTINUED | OUTPATIENT
Start: 2020-10-22 | End: 2020-10-22 | Stop reason: HOSPADM

## 2020-10-22 RX ORDER — ONDANSETRON 2 MG/ML
4 INJECTION INTRAMUSCULAR; INTRAVENOUS ONCE AS NEEDED
Status: CANCELLED | OUTPATIENT
Start: 2020-10-22

## 2020-10-22 RX ORDER — SODIUM CHLORIDE, SODIUM LACTATE, POTASSIUM CHLORIDE, CALCIUM CHLORIDE 600; 310; 30; 20 MG/100ML; MG/100ML; MG/100ML; MG/100ML
125 INJECTION, SOLUTION INTRAVENOUS CONTINUOUS
Status: CANCELLED | OUTPATIENT
Start: 2020-10-22

## 2020-10-22 RX ORDER — FENTANYL CITRATE/PF 50 MCG/ML
25 SYRINGE (ML) INJECTION
Status: DISCONTINUED | OUTPATIENT
Start: 2020-10-22 | End: 2020-10-22 | Stop reason: HOSPADM

## 2020-10-22 RX ORDER — DIPHENHYDRAMINE HYDROCHLORIDE 50 MG/ML
12.5 INJECTION INTRAMUSCULAR; INTRAVENOUS ONCE AS NEEDED
Status: DISCONTINUED | OUTPATIENT
Start: 2020-10-22 | End: 2020-10-22 | Stop reason: HOSPADM

## 2020-10-22 RX ORDER — FENTANYL CITRATE 50 UG/ML
INJECTION, SOLUTION INTRAMUSCULAR; INTRAVENOUS AS NEEDED
Status: DISCONTINUED | OUTPATIENT
Start: 2020-10-22 | End: 2020-10-22

## 2020-10-22 RX ORDER — MIDAZOLAM HYDROCHLORIDE 2 MG/2ML
INJECTION, SOLUTION INTRAMUSCULAR; INTRAVENOUS AS NEEDED
Status: DISCONTINUED | OUTPATIENT
Start: 2020-10-22 | End: 2020-10-22

## 2020-10-22 RX ORDER — PROPOFOL 10 MG/ML
INJECTION, EMULSION INTRAVENOUS CONTINUOUS PRN
Status: DISCONTINUED | OUTPATIENT
Start: 2020-10-22 | End: 2020-10-22

## 2020-10-22 RX ORDER — SODIUM CHLORIDE, SODIUM LACTATE, POTASSIUM CHLORIDE, CALCIUM CHLORIDE 600; 310; 30; 20 MG/100ML; MG/100ML; MG/100ML; MG/100ML
125 INJECTION, SOLUTION INTRAVENOUS CONTINUOUS
Status: DISPENSED | OUTPATIENT
Start: 2020-10-22

## 2020-10-22 RX ORDER — HYDROMORPHONE HCL/PF 1 MG/ML
0.2 SYRINGE (ML) INJECTION
Status: DISCONTINUED | OUTPATIENT
Start: 2020-10-22 | End: 2020-10-22 | Stop reason: HOSPADM

## 2020-10-22 RX ORDER — ONDANSETRON 2 MG/ML
4 INJECTION INTRAMUSCULAR; INTRAVENOUS EVERY 8 HOURS PRN
Status: ACTIVE | OUTPATIENT
Start: 2020-10-22

## 2020-10-22 RX ORDER — ONDANSETRON 2 MG/ML
INJECTION INTRAMUSCULAR; INTRAVENOUS AS NEEDED
Status: DISCONTINUED | OUTPATIENT
Start: 2020-10-22 | End: 2020-10-22

## 2020-10-22 RX ORDER — BUPIVACAINE HYDROCHLORIDE AND EPINEPHRINE 5; 5 MG/ML; UG/ML
INJECTION, SOLUTION EPIDURAL; INTRACAUDAL; PERINEURAL AS NEEDED
Status: DISCONTINUED | OUTPATIENT
Start: 2020-10-22 | End: 2020-10-22 | Stop reason: HOSPADM

## 2020-10-22 RX ORDER — CEFAZOLIN SODIUM 1 G/50ML
1000 SOLUTION INTRAVENOUS ONCE
Status: COMPLETED | OUTPATIENT
Start: 2020-10-22 | End: 2020-10-22

## 2020-10-22 RX ORDER — LIDOCAINE HYDROCHLORIDE AND EPINEPHRINE 10; 10 MG/ML; UG/ML
INJECTION, SOLUTION INFILTRATION; PERINEURAL AS NEEDED
Status: DISCONTINUED | OUTPATIENT
Start: 2020-10-22 | End: 2020-10-22 | Stop reason: HOSPADM

## 2020-10-22 RX ORDER — OXYCODONE HYDROCHLORIDE AND ACETAMINOPHEN 5; 325 MG/1; MG/1
2 TABLET ORAL EVERY 4 HOURS PRN
Status: DISPENSED | OUTPATIENT
Start: 2020-10-22

## 2020-10-22 RX ORDER — LIDOCAINE HYDROCHLORIDE 10 MG/ML
INJECTION, SOLUTION EPIDURAL; INFILTRATION; INTRACAUDAL; PERINEURAL AS NEEDED
Status: DISCONTINUED | OUTPATIENT
Start: 2020-10-22 | End: 2020-10-22

## 2020-10-22 RX ORDER — EPHEDRINE SULFATE 50 MG/ML
INJECTION INTRAVENOUS AS NEEDED
Status: DISCONTINUED | OUTPATIENT
Start: 2020-10-22 | End: 2020-10-22

## 2020-10-22 RX ORDER — DEXMEDETOMIDINE HYDROCHLORIDE 100 UG/ML
INJECTION, SOLUTION INTRAVENOUS AS NEEDED
Status: DISCONTINUED | OUTPATIENT
Start: 2020-10-22 | End: 2020-10-22

## 2020-10-22 RX ORDER — PROPOFOL 10 MG/ML
INJECTION, EMULSION INTRAVENOUS AS NEEDED
Status: DISCONTINUED | OUTPATIENT
Start: 2020-10-22 | End: 2020-10-22

## 2020-10-22 RX ORDER — DEXAMETHASONE SODIUM PHOSPHATE 10 MG/ML
INJECTION, SOLUTION INTRAMUSCULAR; INTRAVENOUS AS NEEDED
Status: DISCONTINUED | OUTPATIENT
Start: 2020-10-22 | End: 2020-10-22

## 2020-10-22 RX ORDER — ALBUTEROL SULFATE 2.5 MG/3ML
2.5 SOLUTION RESPIRATORY (INHALATION) ONCE AS NEEDED
Status: DISCONTINUED | OUTPATIENT
Start: 2020-10-22 | End: 2020-10-22 | Stop reason: HOSPADM

## 2020-10-22 RX ADMIN — SODIUM CHLORIDE, SODIUM LACTATE, POTASSIUM CHLORIDE, AND CALCIUM CHLORIDE: .6; .31; .03; .02 INJECTION, SOLUTION INTRAVENOUS at 15:05

## 2020-10-22 RX ADMIN — FENTANYL CITRATE 25 MCG: 50 INJECTION INTRAMUSCULAR; INTRAVENOUS at 15:37

## 2020-10-22 RX ADMIN — FENTANYL CITRATE 25 MCG: 50 INJECTION INTRAMUSCULAR; INTRAVENOUS at 17:25

## 2020-10-22 RX ADMIN — LIDOCAINE HYDROCHLORIDE 50 MG: 10 INJECTION, SOLUTION EPIDURAL; INFILTRATION; INTRACAUDAL at 15:27

## 2020-10-22 RX ADMIN — PROPOFOL 150 MCG/KG/MIN: 10 INJECTION, EMULSION INTRAVENOUS at 15:29

## 2020-10-22 RX ADMIN — HYDROMORPHONE HYDROCHLORIDE 0.2 MG: 1 INJECTION, SOLUTION INTRAMUSCULAR; INTRAVENOUS; SUBCUTANEOUS at 17:44

## 2020-10-22 RX ADMIN — FENTANYL CITRATE 25 MCG: 50 INJECTION INTRAMUSCULAR; INTRAVENOUS at 16:33

## 2020-10-22 RX ADMIN — CEFAZOLIN SODIUM 1000 MG: 1 SOLUTION INTRAVENOUS at 15:23

## 2020-10-22 RX ADMIN — FENTANYL CITRATE 25 MCG: 50 INJECTION INTRAMUSCULAR; INTRAVENOUS at 15:35

## 2020-10-22 RX ADMIN — FENTANYL CITRATE 25 MCG: 50 INJECTION INTRAMUSCULAR; INTRAVENOUS at 15:42

## 2020-10-22 RX ADMIN — FENTANYL CITRATE 25 MCG: 50 INJECTION INTRAMUSCULAR; INTRAVENOUS at 15:40

## 2020-10-22 RX ADMIN — FENTANYL CITRATE 25 MCG: 50 INJECTION INTRAMUSCULAR; INTRAVENOUS at 17:33

## 2020-10-22 RX ADMIN — FENTANYL CITRATE 50 MCG: 50 INJECTION INTRAMUSCULAR; INTRAVENOUS at 16:19

## 2020-10-22 RX ADMIN — FENTANYL CITRATE 25 MCG: 50 INJECTION INTRAMUSCULAR; INTRAVENOUS at 17:18

## 2020-10-22 RX ADMIN — DEXAMETHASONE SODIUM PHOSPHATE 4 MG: 10 INJECTION, SOLUTION INTRAMUSCULAR; INTRAVENOUS at 15:29

## 2020-10-22 RX ADMIN — DEXMEDETOMIDINE HYDROCHLORIDE 8 MCG: 100 INJECTION, SOLUTION INTRAVENOUS at 16:28

## 2020-10-22 RX ADMIN — OXYCODONE HYDROCHLORIDE AND ACETAMINOPHEN 1 TABLET: 5; 325 TABLET ORAL at 18:33

## 2020-10-22 RX ADMIN — EPHEDRINE SULFATE 5 MG: 50 INJECTION, SOLUTION INTRAVENOUS at 15:44

## 2020-10-22 RX ADMIN — PROPOFOL 200 MG: 10 INJECTION, EMULSION INTRAVENOUS at 15:27

## 2020-10-22 RX ADMIN — ONDANSETRON 4 MG: 2 INJECTION INTRAMUSCULAR; INTRAVENOUS at 16:29

## 2020-10-22 RX ADMIN — EPHEDRINE SULFATE 5 MG: 50 INJECTION, SOLUTION INTRAVENOUS at 15:38

## 2020-10-22 RX ADMIN — MIDAZOLAM HYDROCHLORIDE 2 MG: 1 INJECTION, SOLUTION INTRAMUSCULAR; INTRAVENOUS at 15:23

## 2021-01-01 NOTE — PROGRESS NOTES
Procedure type: Site A     __x___ultrasound guided _____stereotactic    Breast:    _____Left ___x__Right    Location: 5 o'clock 6 CMFN    Needle: 12 Gauge reinaldo    # of passes: 3     Clip: Heart    Performed by: Dr Berenice Saldana held for 5 minutes by: Benson Raoul     Steri Strips:    ___x__yes _____no    Band aid:    ___x__yes_____no    Tape and guaze:    _____yes ___x__no    Tolerated procedure:    ___x__yes _____no 1

## 2021-03-09 NOTE — PROGRESS NOTES
AMB US Pelvic Non OB    Date/Time: 3/10/2021 1:03 PM  Performed by: Hanna Perez  Authorized by: Anthony Ferrara DO   Universal Protocol:  Patient identity confirmed: verbally with patient      Procedure details:     Technique:  Transvaginal US, Non-OB    Position: lithotomy exam    Uterine findings:     Length (cm): 7 31    Height (cm):  4 51    Width (cm):  6 46    Endometrial stripe: identified      Endometrium thickness (mm):  11 3  Left ovary findings:     Left ovary:  Visualized    Length (cm): 3 04    Height (cm): 1 44    Width (cm): 1 84  Right ovary findings:     Right ovary:  Visualized    Length (cm): 3 16    Height (cm): 2 05    Width (cm): 2 46  Other findings:     Free pelvic fluid: identified    Post-Procedure Details:     Impression:  Anteverted uterus and bilateral ovaries appear within normal limits  The right ovary contains a 8 0TM follicle  There is a small amount of free fluid within the cul de sac  Tolerance: Tolerated well, no immediate complications    Complications: no complications    Additional Procedure Comments:      seed[employer]iq P5 transvaginal transducer E8C with Serial Number [id number] was used during procedure and subsequently cleaned with high level disinfection utilizing the [employer]       Ultrasound performed at:     [facility] for [address]  [address], [address]  Phone: [phone]  Fax:  [phone]

## 2021-03-10 ENCOUNTER — ULTRASOUND (OUTPATIENT)
Dept: GYNECOLOGY | Facility: CLINIC | Age: 26
End: 2021-03-10
Payer: COMMERCIAL

## 2021-03-10 ENCOUNTER — OFFICE VISIT (OUTPATIENT)
Dept: GYNECOLOGY | Facility: CLINIC | Age: 26
End: 2021-03-10
Payer: COMMERCIAL

## 2021-03-10 VITALS — SYSTOLIC BLOOD PRESSURE: 110 MMHG | DIASTOLIC BLOOD PRESSURE: 62 MMHG | HEART RATE: 72 BPM

## 2021-03-10 DIAGNOSIS — R68.82 DECREASED LIBIDO WITHOUT SEXUAL DYSFUNCTION: ICD-10-CM

## 2021-03-10 DIAGNOSIS — Z15.01 BRCA GENE MUTATION POSITIVE IN FEMALE: Primary | ICD-10-CM

## 2021-03-10 DIAGNOSIS — Z15.09 BRCA2 GENE MUTATION POSITIVE: Primary | ICD-10-CM

## 2021-03-10 DIAGNOSIS — Z15.01 BRCA2 GENE MUTATION POSITIVE: Primary | ICD-10-CM

## 2021-03-10 DIAGNOSIS — Z15.02 BRCA GENE MUTATION POSITIVE IN FEMALE: Primary | ICD-10-CM

## 2021-03-10 DIAGNOSIS — Z15.09 BRCA GENE MUTATION POSITIVE IN FEMALE: Primary | ICD-10-CM

## 2021-03-10 PROCEDURE — 99213 OFFICE O/P EST LOW 20 MIN: CPT | Performed by: OBSTETRICS & GYNECOLOGY

## 2021-03-10 PROCEDURE — 76830 TRANSVAGINAL US NON-OB: CPT | Performed by: OBSTETRICS & GYNECOLOGY

## 2021-03-10 RX ORDER — NORETHINDRONE ACETATE AND ETHINYL ESTRADIOL AND FERROUS FUMARATE 1MG-20(24)
1 KIT ORAL DAILY
Qty: 84 TABLET | Refills: 3 | Status: SHIPPED | OUTPATIENT
Start: 2021-03-10 | End: 2021-11-09 | Stop reason: ALTCHOICE

## 2021-03-10 NOTE — PROGRESS NOTES
Assessment/Plan:         Diagnoses and all orders for this visit:    BRCA2 gene mutation positive; transvaginal scan results reviewed with patient  Ovaries were within normal limits  Decreased libido without sexual dysfunction; will restart oral contraceptives  If this does not improve libido she will return to the office  Subjective:      Patient ID: Arnel Villagran is a 22 y o  female  HPI   patient presents to the office today for transvaginal scan secondary to genetic susceptibility for breast and ovarian cancer/brac positive  She is status post bilateral mastectomy  Her menses are regular  Denies any vaginal irritation, burning, discharge  Denies any abdominal pain  Her main complaint today is decreased libido  This was not an issue, in the past, when she was on oral contraceptives  The following portions of the patient's history were reviewed and updated as appropriate:   She  has a past medical history of Anaphylaxis, Asthma, BRCA positive, Cervical stenosis, Cervical stenosis (uterine cervix), Dysmenorrhea, GERD (gastroesophageal reflux disease), Hives, Inguinal mass, Irritable bowel, Menorrhagia, Pap smear for cervical cancer screening (05/31/2017), PONV (postoperative nausea and vomiting), and Vitamin D deficiency    She   Patient Active Problem List    Diagnosis Date Noted    Seborrhea capitis 10/19/2019    BRCA2 gene mutation positive 09/30/2019    Irritant contact dermatitis due to plants, except food 03/23/2019    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 03/23/2019    Angio-edema 03/23/2019    Allergic asthma, mild intermittent, uncomplicated 27/10/8594    Allergic rhinitis caused by mold 03/23/2019    Seasonal allergic rhinitis due to pollen 03/23/2019    Allergic rhinitis due to animal (cat) (dog) hair and dander 03/23/2019    Acute atopic conjunctivitis, bilateral 03/23/2019    Intrinsic eczema 03/23/2019    PFAS (pollen-food allergy syndrome), initial encounter 03/23/2019    Latex allergy, contact dermatitis 03/23/2019    Irritant contact dermatitis due to plant 03/21/2019    Exercise-induced asthma 05/28/2015     She  has a past surgical history that includes [place] tooth extraction (Bilateral); Knee cartilage surgery; US guided breast biopsy right complete (Right, 12/16/2019); US guided breast biopsy left complete (Left, 12/16/2019); Knee arthroscopy (Right); Colonoscopy; Cyst Removal (Right); pr mastectomy, simple, complete (Bilateral, 1/30/2020); pr tissue expander placement breast reconstruction (Bilateral, 1/30/2020); pr implnt bio implnt for soft tissue reinforcement (Bilateral, 1/30/2020); pr adj tiss xfer trunk 10 1-30 sqcm (Bilateral, 1/30/2020); pr insj/rplcmt breast implant sep day mastectomy (Bilateral, 6/18/2020); Mastectomy (Bilateral); and pr grafting of autologous fat by lipo 50 cc or less (Bilateral, 10/22/2020)  Her family history includes Allergies in her brother; Anesthesia problems in her mother; BRCA 1/2 in her brother, maternal uncle, and mother; Brain cancer in her maternal grandmother; Heart disease in her father; Hypertension in her father; Ovarian cancer (age of onset: 48) in her maternal grandmother  She  reports that she has never smoked  She has never used smokeless tobacco  She reports current alcohol use of about 4 0 standard drinks of alcohol per week  She reports that she does not use drugs  Current Outpatient Medications   Medication Sig Dispense Refill    albuterol (Ventolin HFA) 90 mcg/act inhaler Inhale 2 puffs every 6 (six) hours as needed for wheezing 18 g 0    EPINEPHrine (EPIPEN) 0 3 mg/0 3 mL SOAJ Inject 0 3 mg into a muscle as needed for anaphylaxis  1     No current facility-administered medications for this visit        Facility-Administered Medications Ordered in Other Visits   Medication Dose Route Frequency Provider Last Rate Last Admin    bacitracin 50,000 Units in lactated ringers 1,000 mL irrigation bag Irrigation Once Pegge Cogan, MD        lactated ringers infusion  125 mL/hr Intravenous Continuous Christiano Suarez MD   Stopped at 10/22/20 1706    lidocaine (PF) (XYLOCAINE-MPF) 1 % 50 mL, EPINEPHrine PF (ADRENALIN) 1 mL in lactated ringers 1,000 mL irrigation   Irrigation Once Marielena Rivera MD        [facility]dansetron [facility]) injection 4 mg  4 mg Intravenous Q8H PRN Pegge Cogan, MD        oxyCODONE-acetaminophen (PERCOCET) 5-325 mg per tablet 2 tablet  2 tablet Oral Q4H PRN Pegge Cogan, MD   1 tablet at 10/22/20 8233     Current Outpatient Medications on File Prior to Visit   Medication Sig    albuterol (Ventolin HFA) 90 mcg/act inhaler Inhale 2 puffs every 6 (six) hours as needed for wheezing    EPINEPHrine (EPIPEN) 0 3 mg/0 3 mL SOAJ Inject 0 3 mg into a muscle as needed for anaphylaxis    [DISCONTINUED] omeprazole (PriLOSEC) 40 MG capsule Take 40 mg by mouth as needed     Current Facility-Administered Medications on File Prior to Visit   Medication    bacitracin 50,000 Units in lactated ringers 1,000 mL irrigation bag    lactated ringers infusion    lidocaine (PF) (XYLOCAINE-MPF) 1 % 50 mL, EPINEPHrine PF (ADRENALIN) 1 mL in lactated ringers 1,000 mL irrigation    ondansetron (ZOFRAN) injection 4 mg    oxyCODONE-acetaminophen (PERCOCET) 5-325 mg per tablet 2 tablet     She is allergic to nuts; peach [prunus persica]; latex; nsaids; other; and tolmetin       Review of Systems      Objective:      /62 (BP Location: Left arm, Patient Position: Sitting, Cuff Size: Standard)   Pulse 72          Physical Exam  Abdominal:      General: There is no distension  Palpations: Abdomen is soft  There is no mass  Tenderness: There is no abdominal tenderness  There is no guarding or rebound  Hernia: No hernia is present  There is no hernia in the left inguinal area or right inguinal area  Genitourinary:     General: Normal vulva  Labia:         Right: No rash, tenderness or lesion           Left: No rash, tenderness or lesion  Vagina: Normal       Cervix: Normal       Uterus: Normal        Adnexa:         Right: No mass, tenderness or fullness  Left: No mass, tenderness or fullness  Lymphadenopathy:      Lower Body: No right inguinal adenopathy  No left inguinal adenopathy  AMB US Pelvic Non OB   Date/Time: 3/10/2021 1:03 PM  Performed by: Mona Setting  Authorized by: Steve Bruno DO   Universal Protocol:  Patient identity confirmed: verbally with patient        Procedure details:     Technique:  Transvaginal US, Non-OB    Position: lithotomy exam    Uterine findings:     Length (cm): 7 31    Height (cm):  4 51    Width (cm):  6 46    Endometrial stripe: identified      Endometrium thickness (mm):  11 3  Left ovary findings:     Left ovary:  Visualized    Length (cm): 3 04    Height (cm): 1 44    Width (cm): 1 84  Right ovary findings:     Right ovary:  Visualized    Length (cm): 3 16    Height (cm): 2 05    Width (cm): 2 46  Other findings:     Free pelvic fluid: identified    Post-Procedure Details:     Impression:  Anteverted uterus and bilateral ovaries appear within normal limits  The right ovary contains a 4 5KZ follicle  There is a small amount of free fluid within the cul de sac  Tolerance:   Tolerated well, no immediate complications    Complications: no complications    Additional Procedure Comments:

## 2021-09-29 ENCOUNTER — ULTRASOUND (OUTPATIENT)
Dept: GYNECOLOGY | Facility: CLINIC | Age: 26
End: 2021-09-29
Payer: COMMERCIAL

## 2021-09-29 ENCOUNTER — ANNUAL EXAM (OUTPATIENT)
Dept: GYNECOLOGY | Facility: CLINIC | Age: 26
End: 2021-09-29
Payer: COMMERCIAL

## 2021-09-29 ENCOUNTER — APPOINTMENT (OUTPATIENT)
Dept: LAB | Facility: MEDICAL CENTER | Age: 26
End: 2021-09-29
Payer: COMMERCIAL

## 2021-09-29 VITALS
HEART RATE: 77 BPM | WEIGHT: 140 LBS | HEIGHT: 62 IN | SYSTOLIC BLOOD PRESSURE: 110 MMHG | BODY MASS INDEX: 25.76 KG/M2 | DIASTOLIC BLOOD PRESSURE: 62 MMHG

## 2021-09-29 DIAGNOSIS — N93.9 ABNORMAL UTERINE BLEEDING (AUB): ICD-10-CM

## 2021-09-29 DIAGNOSIS — Z01.419 ENCOUNTER FOR GYNECOLOGICAL EXAMINATION WITHOUT ABNORMAL FINDING: ICD-10-CM

## 2021-09-29 DIAGNOSIS — Z15.09 BRCA2 GENE MUTATION POSITIVE: ICD-10-CM

## 2021-09-29 DIAGNOSIS — Z15.09 BRCA2 GENE MUTATION POSITIVE: Primary | ICD-10-CM

## 2021-09-29 DIAGNOSIS — Z01.419 ENCOUNTER FOR GYNECOLOGICAL EXAMINATION WITH PAPANICOLAOU SMEAR OF CERVIX: Primary | ICD-10-CM

## 2021-09-29 DIAGNOSIS — R39.9 URINARY TRACT INFECTION SYMPTOMS: Primary | ICD-10-CM

## 2021-09-29 DIAGNOSIS — Z15.01 BRCA2 GENE MUTATION POSITIVE: Primary | ICD-10-CM

## 2021-09-29 DIAGNOSIS — R10.2 PELVIC PAIN: ICD-10-CM

## 2021-09-29 DIAGNOSIS — Z15.01 BRCA2 GENE MUTATION POSITIVE: ICD-10-CM

## 2021-09-29 LAB — CANCER AG125 SERPL-ACNC: 7.3 U/ML (ref 0–30)

## 2021-09-29 PROCEDURE — 86304 IMMUNOASSAY TUMOR CA 125: CPT

## 2021-09-29 PROCEDURE — 36415 COLL VENOUS BLD VENIPUNCTURE: CPT

## 2021-09-29 PROCEDURE — G0145 SCR C/V CYTO,THINLAYER,RESCR: HCPCS | Performed by: OBSTETRICS & GYNECOLOGY

## 2021-09-29 PROCEDURE — 99395 PREV VISIT EST AGE 18-39: CPT | Performed by: OBSTETRICS & GYNECOLOGY

## 2021-09-29 PROCEDURE — 76830 TRANSVAGINAL US NON-OB: CPT | Performed by: OBSTETRICS & GYNECOLOGY

## 2021-09-29 NOTE — PROGRESS NOTES
AMB US Pelvic Non OB    Date/Time: 9/29/2021 7:36 AM  Performed by: Beau Bhatti  Authorized by: Chao Kolb DO   [place] Protocol:  Patient identity confirmed: verbally with patient      Procedure details:     Technique:  Transvaginal US, Non-OB    Position: lithotomy exam    Uterine findings:     Length (cm): 7 4    Height (cm):  4 13    Width (cm):  6 03    Endometrial stripe: identified      Endometrium thickness (mm):  7 1  Left ovary findings:     Left ovary:  Visualized    Length (cm): 3 82    Height (cm): 2 34    Width (cm): 2 99  Right ovary findings:     Right ovary:  Visualized    Length (cm): 3 7    Height (cm): 2 45    Width (cm): 2 12  Other findings:     Free pelvic fluid: not identified      Free peritoneal fluid: not identified    Post-Procedure Details:     Impression:  Anteverted uterus and bilateral ovaries appear within normal limits  No free fluid  Tolerance: Tolerated well, no immediate complications    Complications: no complications    Additional Procedure Comments:      [employer] P5 transvaginal transducer E8C with Serial Number [id number] was used during procedure and subsequently cleaned with high level disinfection utilizing the [employer]       Ultrasound performed at:     [facility] for [address]  [address], [address]  Phone: [phone]  Fax:  [phone]

## 2021-09-29 NOTE — PROGRESS NOTES
Assessment/Plan:     Diagnoses and all orders for this visit:    BRCA2 gene mutation positive; T VS scan today normal   -     ; Future    Encounter for gynecological examination without abnormal finding    Abnormal uterine bleeding (AUB); will start on NuvaRing  She has had problems in the past with oral contraceptives aggravating migraines  She had breakthrough bleeding on progesterone only pill  She has been on NuvaRing in the past and this worked well for her  Cystic lesion left chest wall: Referred back to plastic surgeon for further evaluation    Subjective:      Patient ID: Renea Sosa is a 32 y o  female  HPI  patient presents for yearly examination and transvaginal scan  She is BRCA2 positive  She is status post bilateral mastectomy  Past 2 cycles she is experiencing some midcycle spotting with heavy menses  For contraception presently using condoms  Denies any vaginal irritation, burning or discharge  Denies any dysuria, hematuria urgency or urinary incontinence  No GI complaints  The following portions of the patient's history were reviewed and updated as appropriate:   She  has a past medical history of Anaphylaxis, Asthma, BRCA positive, Cervical stenosis, Cervical stenosis (uterine cervix), Dysmenorrhea, GERD (gastroesophageal reflux disease), Hives, Inguinal mass, Irritable bowel, Menorrhagia, Pap smear for cervical cancer screening (05/31/2017), PONV (postoperative nausea and vomiting), and Vitamin D deficiency    She   Patient Active Problem List    Diagnosis Date Noted    Seborrhea capitis 10/19/2019    BRCA2 gene mutation positive 09/30/2019    Irritant contact dermatitis due to plants, except food 03/23/2019    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 03/23/2019    Angio-edema 03/23/2019    Allergic asthma, mild intermittent, uncomplicated 26/07/4513    Allergic rhinitis caused by mold 03/23/2019    Seasonal allergic rhinitis due to pollen 03/23/2019    Allergic rhinitis due to animal (cat) (dog) hair and dander 03/23/2019    Acute atopic conjunctivitis, bilateral 03/23/2019    Intrinsic eczema 03/23/2019    PFAS (pollen-food allergy syndrome), initial encounter 03/23/2019    Latex allergy, contact dermatitis 03/23/2019    Irritant contact dermatitis due to plant 03/21/2019    Exercise-induced asthma 05/28/2015     She  has a past surgical history that includes [place] tooth extraction (Bilateral); Knee cartilage surgery; US guided breast biopsy right complete (Right, 12/16/2019); US guided breast biopsy left complete (Left, 12/16/2019); Knee arthroscopy (Right); Colonoscopy; Cyst Removal (Right); pr mastectomy, simple, complete (Bilateral, 1/30/2020); pr tissue expander placement breast reconstruction (Bilateral, 1/30/2020); pr implnt bio implnt for soft tissue reinforcement (Bilateral, 1/30/2020); pr adj tiss xfer trunk 10 1-30 sqcm (Bilateral, 1/30/2020); pr insj/rplcmt breast implant sep day mastectomy (Bilateral, 6/18/2020); Mastectomy (Bilateral); and pr grafting of autologous fat by lipo 50 cc or less (Bilateral, 10/22/2020)  Her family history includes Allergies in her brother; Anesthesia problems in her mother; BRCA 1/2 in her brother, maternal uncle, and mother; Brain cancer in her maternal grandmother; Heart disease in her father; Hypertension in her father; Ovarian cancer (age of onset: 48) in her maternal grandmother  She  reports that she has never smoked  She has never used smokeless tobacco  She reports current alcohol use of about 4 0 standard drinks of alcohol per week  She reports that she does not use drugs    Current Outpatient Medications   Medication Sig Dispense Refill    albuterol (Ventolin HFA) 90 mcg/act inhaler Inhale 2 puffs every 6 (six) hours as needed for wheezing 18 g 0    EPINEPHrine (EPIPEN) 0 3 mg/0 3 mL SOAJ Inject 0 3 mg into a muscle as needed for anaphylaxis  1    norethindrone-ethinyl estradiol-ferrous fumarate (LOESTIN 24 FE) 1-20 MG-MCG(24) per tablet Take 1 tablet by mouth daily 84 tablet 3     No current facility-administered medications for this visit  Facility-Administered Medications Ordered in Other Visits   Medication Dose Route Frequency Provider Last Rate Last Admin    bacitracin 50,000 Units in lactated ringers 1,000 mL irrigation bag   Irrigation Once Prince Maria Isabel MD        lactated ringers infusion  125 mL/hr Intravenous Continuous Matti MD William   Stopped at 10/22/20 1706    lidocaine (PF) (XYLOCAINE-MPF) 1 % 50 mL, EPINEPHrine PF (ADRENALIN) 1 mL in lactated ringers 1,000 mL irrigation   Irrigation Once Prince Maria Isabel MD        ondansetron [facility]) injection 4 mg  4 mg Intravenous Q8H PRN [place] Abimael Rivera MD        oxyCODONE-acetaminophen [facility]) 5-325 mg per tablet 2 tablet  2 tablet Oral Q4H PRN [place] Abimael Rivera MD   1 tablet at 10/22/20 2210     Current Outpatient Medications on File Prior to Visit   Medication Sig    albuterol (Ventolin HFA) 90 mcg/act inhaler Inhale 2 puffs every 6 (six) hours as needed for wheezing    EPINEPHrine (EPIPEN) 0 3 mg/0 3 mL SOAJ Inject 0 3 mg into a muscle as needed for anaphylaxis    norethindrone-ethinyl estradiol-ferrous fumarate (LOESTIN 24 FE) 1-20 MG-MCG(24) per tablet Take 1 tablet by mouth daily     Current Facility-Administered Medications on File Prior to Visit   Medication    bacitracin 50,000 Units in lactated ringers 1,000 mL irrigation bag    lactated ringers infusion    lidocaine (PF) (XYLOCAINE-MPF) 1 % 50 mL, EPINEPHrine PF (ADRENALIN) 1 mL in lactated ringers 1,000 mL irrigation    ondansetron (ZOFRAN) injection 4 mg    oxyCODONE-acetaminophen (PERCOCET) 5-325 mg per tablet 2 tablet     She is allergic to nuts - food allergy, peach [prunus persica], latex, nsaids, other, and tolmetin       Review of Systems   Constitutional: Negative  HENT: Negative for sore throat and trouble swallowing  Gastrointestinal: Negative      Genitourinary: Positive for menstrual problem  Objective:      /62   Pulse 77   Ht 5' 2" (1 575 m)   Wt 63 5 kg (140 lb)   BMI 25 61 kg/m²          Physical Exam  Vitals reviewed  Constitutional:       Appearance: Normal appearance  She is normal weight  Cardiovascular:      Rate and Rhythm: Normal rate and regular rhythm  Pulses: Normal pulses  Heart sounds: Normal heart sounds  Pulmonary:      Effort: Pulmonary effort is normal  No respiratory distress  Breath sounds: Normal breath sounds  Chest:      Breasts:         Right: Absent  Left: Absent  Comments: Status post bilateral mastectomy with reconstruction  There was a 1 cm smooth freely movable cystic lesion at the superior aspect of the left implant  Abdominal:      General: There is no distension  Palpations: Abdomen is soft  There is no mass  Tenderness: There is no abdominal tenderness  There is no guarding or rebound  Hernia: No hernia is present  There is no hernia in the left inguinal area or right inguinal area  Genitourinary:     General: Normal vulva  Labia:         Right: No rash, tenderness or lesion  Left: No rash, tenderness or lesion  Vagina: Normal       Cervix: Normal       Uterus: Normal        Adnexa:         Right: No mass, tenderness or fullness  Left: No mass, tenderness or fullness  Musculoskeletal:      Cervical back: Normal range of motion and neck supple  No tenderness  Lymphadenopathy:      Cervical: No cervical adenopathy  Upper Body:      Right upper body: No supraclavicular, axillary or pectoral adenopathy  Left upper body: No supraclavicular, axillary or pectoral adenopathy  Lower Body: No right inguinal adenopathy  No left inguinal adenopathy  Neurological:      Mental Status: She is alert         AMB US Pelvic Non OB     Date/Time: 9/29/2021 7:36 AM  Performed by: Jaclyn Sal  Authorized by: Carlyle Gu DO Universal Protocol:  Patient identity confirmed: verbally with patient        Procedure details:     Technique:  Transvaginal US, Non-OB    Position: lithotomy exam    Uterine findings:     Length (cm): 7 4    Height (cm):  4 13    Width (cm):  6 03    Endometrial stripe: identified      Endometrium thickness (mm):  7 1  Left ovary findings:     Left ovary:  Visualized    Length (cm): 3 82    Height (cm): 2 34    Width (cm): 2 99  Right ovary findings:     Right ovary:  Visualized    Length (cm): 3 7    Height (cm): 2 45    Width (cm): 2 12  Other findings:     Free pelvic fluid: not identified      Free peritoneal fluid: not identified    Post-Procedure Details:     Impression:  Anteverted uterus and bilateral ovaries appear within normal limits  No free fluid  Tolerance:   Tolerated well, no immediate complications

## 2021-10-01 ENCOUNTER — TELEPHONE (OUTPATIENT)
Dept: GYNECOLOGY | Facility: CLINIC | Age: 26
End: 2021-10-01

## 2021-10-04 DIAGNOSIS — B37.9 CANDIDIASIS: Primary | ICD-10-CM

## 2021-10-04 RX ORDER — FLUCONAZOLE 150 MG/1
TABLET ORAL
Qty: 2 TABLET | Refills: 0 | Status: SHIPPED | OUTPATIENT
Start: 2021-10-04 | End: 2021-10-07

## 2021-10-06 LAB
LAB AP GYN PRIMARY INTERPRETATION: NORMAL
Lab: NORMAL

## 2021-10-11 ENCOUNTER — TELEPHONE (OUTPATIENT)
Dept: OBGYN CLINIC | Facility: CLINIC | Age: 26
End: 2021-10-11

## 2021-10-13 DIAGNOSIS — Z30.44 ENCOUNTER FOR SURVEILLANCE OF VAGINAL RING HORMONAL CONTRACEPTIVE DEVICE: Primary | ICD-10-CM

## 2021-10-13 RX ORDER — ETONOGESTREL AND ETHINYL ESTRADIOL 11.7; 2.7 MG/1; MG/1
INSERT, EXTENDED RELEASE VAGINAL
Qty: 3 EACH | Refills: 3 | Status: SHIPPED | OUTPATIENT
Start: 2021-10-13 | End: 2021-11-09 | Stop reason: SDUPTHER

## 2021-11-09 DIAGNOSIS — Z30.44 ENCOUNTER FOR SURVEILLANCE OF VAGINAL RING HORMONAL CONTRACEPTIVE DEVICE: ICD-10-CM

## 2021-11-09 RX ORDER — ETONOGESTREL AND ETHINYL ESTRADIOL 11.7; 2.7 MG/1; MG/1
INSERT, EXTENDED RELEASE VAGINAL
Qty: 3 EACH | Refills: 3 | Status: SHIPPED | OUTPATIENT
Start: 2021-11-09

## 2022-04-13 ENCOUNTER — ANNUAL EXAM (OUTPATIENT)
Dept: GYNECOLOGY | Facility: CLINIC | Age: 27
End: 2022-04-13
Payer: COMMERCIAL

## 2022-04-13 VITALS
WEIGHT: 139.2 LBS | DIASTOLIC BLOOD PRESSURE: 70 MMHG | BODY MASS INDEX: 25.62 KG/M2 | HEIGHT: 62 IN | SYSTOLIC BLOOD PRESSURE: 118 MMHG

## 2022-04-13 DIAGNOSIS — Z12.4 ENCOUNTER FOR PAPANICOLAOU SMEAR FOR CERVICAL CANCER SCREENING: Primary | ICD-10-CM

## 2022-04-13 DIAGNOSIS — Z01.419 ENCOUNTER FOR GYNECOLOGICAL EXAMINATION WITHOUT ABNORMAL FINDING: ICD-10-CM

## 2022-04-13 DIAGNOSIS — Z15.09 BRCA2 GENE MUTATION POSITIVE: ICD-10-CM

## 2022-04-13 DIAGNOSIS — Z15.01 BRCA2 GENE MUTATION POSITIVE: ICD-10-CM

## 2022-04-13 PROCEDURE — G0145 SCR C/V CYTO,THINLAYER,RESCR: HCPCS | Performed by: OBSTETRICS & GYNECOLOGY

## 2022-04-13 PROCEDURE — 99395 PREV VISIT EST AGE 18-39: CPT | Performed by: OBSTETRICS & GYNECOLOGY

## 2022-04-13 RX ORDER — PANTOPRAZOLE SODIUM 40 MG/1
40 TABLET, DELAYED RELEASE ORAL DAILY
COMMUNITY
Start: 2022-02-03 | End: 2023-02-03

## 2022-04-13 NOTE — PROGRESS NOTES
Assessment/Plan:         Diagnoses and all orders for this visit:    Encounter for Papanicolaou smear for cervical cancer screening  -     Liquid-based pap, screening    BRCA2 gene mutation positive; return to the office for T VS rule out ovarian pathology    Encounter for gynecological examination without abnormal finding    Other orders  -     pantoprazole (PROTONIX) 40 mg tablet; Take 40 mg by mouth daily        Subjective:      Patient ID: Caitlin Bhat is a 32 y o  female  HPI  patient presents to the office for annual examination  She is BRCA2 positive  She is status post bilateral mastectomy  She is presently on NuvaRing for contraception  She denies any headaches, vision changes chest pain, calf tenderness or breakthrough bleeding  No urinary or GI complaints  The patient is getting  next October  She is interested in pursuing conceiving following that  The following portions of the patient's history were reviewed and updated as appropriate:   She  has a past medical history of Anaphylaxis, Asthma, BRCA positive, BRCA2 gene mutation positive (9/30/2019), Cervical stenosis, Cervical stenosis (uterine cervix), Dysmenorrhea, GERD (gastroesophageal reflux disease), Hives, Inguinal mass, Irritable bowel, Menorrhagia, Pap smear for cervical cancer screening (05/31/2017), PONV (postoperative nausea and vomiting), and Vitamin D deficiency    She   Patient Active Problem List    Diagnosis Date Noted    Seborrhea capitis 10/19/2019    BRCA2 gene mutation positive 09/30/2019    Irritant contact dermatitis due to plants, except food 03/23/2019    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 03/23/2019    Angio-edema 03/23/2019    Allergic asthma, mild intermittent, uncomplicated 48/25/5773    Allergic rhinitis caused by mold 03/23/2019    Seasonal allergic rhinitis due to pollen 03/23/2019    Allergic rhinitis due to animal (cat) (dog) hair and dander 03/23/2019    Acute atopic conjunctivitis, bilateral 03/23/2019    Intrinsic eczema 03/23/2019    PFAS (pollen-food allergy syndrome), initial encounter 03/23/2019    Latex allergy, contact dermatitis 03/23/2019    Irritant contact dermatitis due to plant 03/21/2019    Exercise-induced asthma 05/28/2015     She  has a past surgical history that includes [place] tooth extraction (Bilateral); Knee cartilage surgery; US guided breast biopsy right complete (Right, 12/16/2019); US guided breast biopsy left complete (Left, 12/16/2019); Knee arthroscopy (Right); Colonoscopy; Cyst Removal (Right); pr mastectomy, simple, complete (Bilateral, 1/30/2020); pr tissue expander placement breast reconstruction (Bilateral, 1/30/2020); pr implnt bio implnt for soft tissue reinforcement (Bilateral, 1/30/2020); pr adj tiss xfer trunk 10 1-30 sqcm (Bilateral, 1/30/2020); pr insj/rplcmt breast implant sep day mastectomy (Bilateral, 6/18/2020); Mastectomy (Bilateral); and pr grafting of autologous fat by lipo 50 cc or less (Bilateral, 10/22/2020)  Her family history includes Allergies in her brother; Anesthesia problems in her mother; BRCA 1/2 in her brother, maternal uncle, and mother; Brain cancer in her maternal grandmother; Heart disease in her father; Hypertension in her father; Ovarian cancer (age of onset: 48) in her maternal grandmother  She  reports that she has never smoked  She has never used smokeless tobacco  She reports current alcohol use of about 4 0 standard drinks of alcohol per week  She reports that she does not use drugs  Current Outpatient Medications   Medication Sig Dispense Refill    etonogestrel-ethinyl estradiol (NuvaRing) 0 12-0 015 MG/24HR vaginal ring Insert vaginally and leave in place for 3 consecutive weeks, then remove for 1 week   3 each 3    ipratropium (ATROVENT) 0 03 % nasal spray 2 sprays into each nostril 4 (four) times a day as needed for rhinitis 30 mL 3    pantoprazole (PROTONIX) 40 mg tablet Take 40 mg by mouth daily      EPINEPHrine (EPIPEN) 0 3 mg/0 3 mL SOAJ Inject 0 3 mg into a muscle as needed for anaphylaxis (Patient not taking: Reported on 4/13/2022 )  1     No current facility-administered medications for this visit  Facility-Administered Medications Ordered in Other Visits   Medication Dose Route Frequency Provider Last Rate Last Admin    bacitracin 50,000 Units in lactated ringers 1,000 mL irrigation bag   Irrigation Once Alphonse Lyn MD        lactated ringers infusion  125 mL/hr Intravenous Continuous Marilyn Keith MD   Stopped at 10/22/20 1706    lidocaine (PF) (XYLOCAINE-MPF) 1 % 50 mL, EPINEPHrine PF (ADRENALIN) 1 mL in lactated ringers 1,000 mL irrigation   Irrigation Once Alphonse Lyn MD        ondansetron [facility]) injection 4 mg  4 mg Intravenous Q8H PRN Ganesh Rivera MD        oxyCODONE-acetaminophen [facility]) 5-325 mg per tablet 2 tablet  2 tablet Oral Q4H PRN Ganesh Rivera MD   1 tablet at 10/22/20 5982     Current Outpatient Medications on File Prior to Visit   Medication Sig    etonogestrel-ethinyl estradiol (NuvaRing) 0 12-0 015 MG/24HR vaginal ring Insert vaginally and leave in place for 3 consecutive weeks, then remove for 1 week      ipratropium (ATROVENT) 0 03 % nasal spray 2 sprays into each nostril 4 (four) times a day as needed for rhinitis    pantoprazole (PROTONIX) 40 mg tablet Take 40 mg by mouth daily    EPINEPHrine (EPIPEN) 0 3 mg/0 3 mL SOAJ Inject 0 3 mg into a muscle as needed for anaphylaxis (Patient not taking: Reported on 4/13/2022 )     Current Facility-Administered Medications on File Prior to Visit   Medication    bacitracin 50,000 Units in lactated ringers 1,000 mL irrigation bag    lactated ringers infusion    lidocaine (PF) (XYLOCAINE-MPF) 1 % 50 mL, EPINEPHrine PF (ADRENALIN) 1 mL in lactated ringers 1,000 mL irrigation    ondansetron (ZOFRAN) injection 4 mg    oxyCODONE-acetaminophen (PERCOCET) 5-325 mg per tablet 2 tablet     She is allergic to nuts - food allergy, peach [prunus persica], latex, nsaids, other, and tolmetin       Review of Systems   Constitutional: Negative  HENT: Negative for sore throat and trouble swallowing  Gastrointestinal: Negative  Genitourinary: Negative  Objective:      /70   Ht 5' 1 75" (1 568 m)   Wt 63 1 kg (139 lb 3 2 oz)   LMP 03/29/2022   BMI 25 67 kg/m²          Physical Exam  Vitals reviewed  Constitutional:       Appearance: Normal appearance  She is normal weight  Cardiovascular:      Rate and Rhythm: Normal rate and regular rhythm  Pulses: Normal pulses  Heart sounds: Normal heart sounds  Pulmonary:      Effort: Pulmonary effort is normal  No respiratory distress  Breath sounds: Normal breath sounds  Chest:   Breasts:      Right: No skin change, axillary adenopathy or supraclavicular adenopathy  Left: No skin change, axillary adenopathy or supraclavicular adenopathy  Comments: Status post bilateral mastectomy  Abdominal:      General: There is no distension  Palpations: Abdomen is soft  There is no mass  Tenderness: There is no abdominal tenderness  There is no guarding or rebound  Hernia: No hernia is present  There is no hernia in the left inguinal area or right inguinal area  Genitourinary:     General: Normal vulva  Labia:         Right: No rash, tenderness or lesion  Left: No rash, tenderness or lesion  Vagina: Normal       Cervix: Normal       Uterus: Normal        Adnexa:         Right: No mass, tenderness or fullness  Left: No mass, tenderness or fullness  Musculoskeletal:      Cervical back: Normal range of motion and neck supple  No tenderness  Lymphadenopathy:      Cervical: No cervical adenopathy  Upper Body:      Right upper body: No supraclavicular, axillary or pectoral adenopathy  Left upper body: No supraclavicular or axillary adenopathy  Lower Body: No right inguinal adenopathy   No left inguinal adenopathy  Neurological:      Mental Status: She is alert

## 2022-04-22 LAB
LAB AP GYN PRIMARY INTERPRETATION: NORMAL
Lab: NORMAL

## 2022-06-01 ENCOUNTER — TELEPHONE (OUTPATIENT)
Dept: GYNECOLOGY | Facility: CLINIC | Age: 27
End: 2022-06-01

## 2022-06-01 ENCOUNTER — HOSPITAL ENCOUNTER (EMERGENCY)
Facility: HOSPITAL | Age: 27
Discharge: HOME | End: 2022-06-01
Attending: EMERGENCY MEDICINE | Admitting: EMERGENCY MEDICINE
Payer: COMMERCIAL

## 2022-06-01 VITALS
BODY MASS INDEX: 25.43 KG/M2 | DIASTOLIC BLOOD PRESSURE: 66 MMHG | HEART RATE: 75 BPM | OXYGEN SATURATION: 100 % | SYSTOLIC BLOOD PRESSURE: 127 MMHG | WEIGHT: 134.7 LBS | RESPIRATION RATE: 17 BRPM | HEIGHT: 61 IN

## 2022-06-01 DIAGNOSIS — R20.2 PARESTHESIA: Primary | ICD-10-CM

## 2022-06-01 DIAGNOSIS — R25.1 SHAKING: ICD-10-CM

## 2022-06-01 LAB
ALBUMIN SERPL BCP-MCNC: 3.9 G/DL (ref 3.5–5)
ALP SERPL-CCNC: 58 U/L (ref 46–116)
ALT SERPL W P-5'-P-CCNC: 37 U/L (ref 12–78)
ANION GAP SERPL CALCULATED.3IONS-SCNC: 11 MMOL/L (ref 4–13)
AST SERPL W P-5'-P-CCNC: 28 U/L (ref 5–45)
ATRIAL RATE: 81 BPM
BASOPHILS # BLD AUTO: 0.02 THOUSANDS/ΜL (ref 0–0.1)
BASOPHILS NFR BLD AUTO: 0 % (ref 0–1)
BILIRUB SERPL-MCNC: 0.37 MG/DL (ref 0.2–1)
BILIRUB UR QL STRIP: NEGATIVE
BUN SERPL-MCNC: 15 MG/DL (ref 5–25)
CALCIUM SERPL-MCNC: 9.3 MG/DL (ref 8.3–10.1)
CHLORIDE SERPL-SCNC: 104 MMOL/L (ref 100–108)
CLARITY UR: CLEAR
CO2 SERPL-SCNC: 25 MMOL/L (ref 21–32)
COLOR UR: YELLOW
CREAT SERPL-MCNC: 0.87 MG/DL (ref 0.6–1.3)
EOSINOPHIL # BLD AUTO: 0.08 THOUSAND/ΜL (ref 0–0.61)
EOSINOPHIL NFR BLD AUTO: 1 % (ref 0–6)
ERYTHROCYTE [DISTWIDTH] IN BLOOD BY AUTOMATED COUNT: 12.4 % (ref 11.6–15.1)
EXT PREG TEST URINE: NEGATIVE
EXT. CONTROL ED NAV: NORMAL
GFR SERPL CREATININE-BSD FRML MDRD: 92 ML/MIN/1.73SQ M
GLUCOSE SERPL-MCNC: 88 MG/DL (ref 65–140)
GLUCOSE UR STRIP-MCNC: NEGATIVE MG/DL
HCT VFR BLD AUTO: 44.3 % (ref 34.8–46.1)
HGB BLD-MCNC: 14.1 G/DL (ref 11.5–15.4)
HGB UR QL STRIP.AUTO: NEGATIVE
IMM GRANULOCYTES # BLD AUTO: 0.01 THOUSAND/UL (ref 0–0.2)
IMM GRANULOCYTES NFR BLD AUTO: 0 % (ref 0–2)
KETONES UR STRIP-MCNC: NEGATIVE MG/DL
LEUKOCYTE ESTERASE UR QL STRIP: NEGATIVE
LYMPHOCYTES # BLD AUTO: 2.03 THOUSANDS/ΜL (ref 0.6–4.47)
LYMPHOCYTES NFR BLD AUTO: 36 % (ref 14–44)
MCH RBC QN AUTO: 28.5 PG (ref 26.8–34.3)
MCHC RBC AUTO-ENTMCNC: 31.8 G/DL (ref 31.4–37.4)
MCV RBC AUTO: 90 FL (ref 82–98)
MONOCYTES # BLD AUTO: 0.3 THOUSAND/ΜL (ref 0.17–1.22)
MONOCYTES NFR BLD AUTO: 5 % (ref 4–12)
NEUTROPHILS # BLD AUTO: 3.21 THOUSANDS/ΜL (ref 1.85–7.62)
NEUTS SEG NFR BLD AUTO: 58 % (ref 43–75)
NITRITE UR QL STRIP: NEGATIVE
NRBC BLD AUTO-RTO: 0 /100 WBCS
P AXIS: 71 DEGREES
PH UR STRIP.AUTO: 7 [PH] (ref 4.5–8)
PLATELET # BLD AUTO: 273 THOUSANDS/UL (ref 149–390)
PMV BLD AUTO: 9.7 FL (ref 8.9–12.7)
POTASSIUM SERPL-SCNC: 3.6 MMOL/L (ref 3.5–5.3)
PR INTERVAL: 146 MS
PROT SERPL-MCNC: 7.5 G/DL (ref 6.4–8.2)
PROT UR STRIP-MCNC: NEGATIVE MG/DL
QRS AXIS: 90 DEGREES
QRSD INTERVAL: 82 MS
QT INTERVAL: 392 MS
QTC INTERVAL: 455 MS
RBC # BLD AUTO: 4.94 MILLION/UL (ref 3.81–5.12)
SODIUM SERPL-SCNC: 140 MMOL/L (ref 136–145)
SP GR UR STRIP.AUTO: 1.01 (ref 1–1.03)
T WAVE AXIS: 48 DEGREES
UROBILINOGEN UR QL STRIP.AUTO: 0.2 E.U./DL
VENTRICULAR RATE: 81 BPM
WBC # BLD AUTO: 5.65 THOUSAND/UL (ref 4.31–10.16)

## 2022-06-01 PROCEDURE — 81003 URINALYSIS AUTO W/O SCOPE: CPT

## 2022-06-01 PROCEDURE — 80053 COMPREHEN METABOLIC PANEL: CPT | Performed by: EMERGENCY MEDICINE

## 2022-06-01 PROCEDURE — 93005 ELECTROCARDIOGRAM TRACING: CPT

## 2022-06-01 PROCEDURE — 99284 EMERGENCY DEPT VISIT MOD MDM: CPT | Performed by: EMERGENCY MEDICINE

## 2022-06-01 PROCEDURE — 96360 HYDRATION IV INFUSION INIT: CPT

## 2022-06-01 PROCEDURE — 36415 COLL VENOUS BLD VENIPUNCTURE: CPT | Performed by: EMERGENCY MEDICINE

## 2022-06-01 PROCEDURE — 93010 ELECTROCARDIOGRAM REPORT: CPT

## 2022-06-01 PROCEDURE — 96361 HYDRATE IV INFUSION ADD-ON: CPT

## 2022-06-01 PROCEDURE — 85025 COMPLETE CBC W/AUTO DIFF WBC: CPT | Performed by: EMERGENCY MEDICINE

## 2022-06-01 PROCEDURE — 99284 EMERGENCY DEPT VISIT MOD MDM: CPT

## 2022-06-01 PROCEDURE — 81025 URINE PREGNANCY TEST: CPT | Performed by: EMERGENCY MEDICINE

## 2022-06-01 RX ORDER — OLANZAPINE 5 MG/1
5 TABLET, ORALLY DISINTEGRATING ORAL ONCE
Status: COMPLETED | OUTPATIENT
Start: 2022-06-01 | End: 2022-06-01

## 2022-06-01 RX ADMIN — OLANZAPINE 5 MG: 5 TABLET, ORALLY DISINTEGRATING ORAL at 13:32

## 2022-06-01 RX ADMIN — SODIUM CHLORIDE 1000 ML: 0.9 INJECTION, SOLUTION INTRAVENOUS at 13:33

## 2022-06-01 NOTE — ED PROVIDER NOTES
History  Chief Complaint   Patient presents with    Numbness     Pt reports numbness over entire body starting this morning, especially in throat  Pt reports shaking and being "out of it" at work this morning  59-year-old female presents for evaluation of paresthesias that started gradually roughly 4 hours prior to arrival   Patient states that the paresthesias start below her eyes wrap around her entire head and radiate down to her entire body to her feet  She states that these paresthesias have been constant, are unchanged, with out modifying factors  In addition to this she feels like she cant " feel where her feet are going when she walks and out of it  She denies any true numbness, focal neuro deficits or weakness, headache, recent fall or head trauma, chest pain, shortness of breath, back pain, abdominal pain, urinary complaints  Patient states that she drank a coffee at 8:00 a m  This morning and she is allergic to tree nuts but so she is unsure if maybe Connie not have gotten her coffee  No history to suggest anaphylaxis  No history of similar symptoms  History provided by:  Patient      Prior to Admission Medications   Prescriptions Last Dose Informant Patient Reported? Taking? EPINEPHrine (EPIPEN) 0 3 mg/0 3 mL SOAJ  Self Yes No   Sig: Inject 0 3 mg into a muscle as needed for anaphylaxis   Patient not taking: Reported on 2022    etonogestrel-ethinyl estradiol (NuvaRing) 0 12-0 015 MG/24HR vaginal ring   No No   Sig: Insert vaginally and leave in place for 3 consecutive weeks, then remove for 1 week     ipratropium (ATROVENT) 0 03 % nasal spray   No No   Si sprays into each nostril 4 (four) times a day as needed for rhinitis   pantoprazole (PROTONIX) 40 mg tablet   Yes No   Sig: Take 40 mg by mouth daily      Facility-Administered Medications: None       Past Medical History:   Diagnosis Date    Anaphylaxis     tree nuts     Asthma     " Exercise Induced "    BRCA positive  BRCA2 gene mutation positive 9/30/2019    Cervical stenosis     Cervical stenosis (uterine cervix)     Dysmenorrhea     GERD (gastroesophageal reflux disease)     Hives     Inguinal mass     right    Irritable bowel     Menorrhagia     Pap smear for cervical cancer screening 05/31/2017    ASCUS, HPV negative    PONV (postoperative nausea and vomiting)     Vitamin D deficiency        Past Surgical History:   Procedure Laterality Date    COLONOSCOPY      CYST REMOVAL Right     Middle Finger    KNEE ARTHROSCOPY Right     KNEE CARTILAGE SURGERY      MASTECTOMY Bilateral     2020    MS ADJ TISS XFER TRUNK 10 1-30 SQCM Bilateral 1/30/2020    Procedure: TRUNK LOCAL FLAP;  Surgeon: Scooby Marie MD;  [facility];  Service: Plastics    MS GRAFTING OF AUTOLOGOUS FAT BY LIPO 50 CC OR LESS Bilateral 10/22/2020    Procedure: BREAST FAT GRAFTING;  Surgeon: Scooby Marie MD;  [facility];  Service: Plastics    MS IMPLNT BIO IMPLNT FOR SOFT TISSUE REINFORCEMENT Bilateral 1/30/2020    Procedure: BREAST RECONSTRUCTION WITH IMPLANT;  Surgeon: Scooby Marie MD;  [facility];  Service: Plastics    MS INSJ/RPLCMT BREAST IMPLANT SEP DAY MASTECTOMY Bilateral 6/18/2020    Procedure: BREAST CAPSULECTOMIES; EXPANDER/IMPLANT EXCHANGE;  Surgeon: Scooby Marie MD;  [facility];  Service: Plastics    MS MASTECTOMY, SIMPLE, COMPLETE Bilateral 1/30/2020    Procedure: BREAST SIMPLE MASTECTOMY;  Surgeon: Keyla Mccoy MD;  [facility];  Service: Surgical Oncology    MS TISSUE EXPANDER PLACEMENT BREAST RECONSTRUCTION Bilateral 1/30/2020    Procedure: BREAST INSERTION/PLACEMENT TISSUE EXPANDER (EXCHANGE);   Surgeon: Scooby Marie MD;  [facility];  Service: Plastics    US GUIDED BREAST BIOPSY LEFT COMPLETE Left 12/16/2019    US GUIDED BREAST BIOPSY RIGHT COMPLETE Right 12/16/2019    WISDOM TOOTH EXTRACTION Bilateral        Family History   Problem Relation Age of Onset    Ovarian cancer Maternal Grandmother 48    Brain cancer Maternal Grandmother     BRCA 1/2 Mother         Prophylactic surgery    Anesthesia problems Mother     Hypertension Father     Heart disease Father     BRCA 1/2 Brother     Allergies Brother     BRCA 1/2 Maternal Uncle      I have reviewed and agree with the history as documented  E-Cigarette/Vaping    E-Cigarette Use Never User      E-Cigarette/Vaping Substances    Nicotine No     THC No     CBD No     Flavoring No     Other No     Unknown No      Social History     Tobacco Use    Smoking status: Never Smoker    Smokeless tobacco: Never Used   Vaping Use    Vaping Use: Never used   Substance Use Topics    Alcohol use: Yes     Alcohol/week: 4 0 standard drinks     Types: 4 Glasses of wine per week     Comment: Socially    Drug use: No       Review of Systems   Constitutional: Negative for activity change, appetite change, fatigue and fever  HENT: Negative for congestion, dental problem, ear pain, rhinorrhea and sore throat  Eyes: Negative for pain and redness  Respiratory: Negative for chest tightness, shortness of breath and wheezing  Cardiovascular: Negative for chest pain and palpitations  Gastrointestinal: Negative for abdominal pain, blood in stool, constipation, diarrhea, nausea and vomiting  Endocrine: Negative for cold intolerance and heat intolerance  Genitourinary: Negative for dysuria, frequency and hematuria  Musculoskeletal: Negative for arthralgias, myalgias, neck pain and neck stiffness  Skin: Negative for color change, pallor and rash  Neurological: Positive for tremors  Negative for dizziness, seizures, syncope, facial asymmetry, speech difficulty, weakness, light-headedness, numbness and headaches  Hematological: Does not bruise/bleed easily  Psychiatric/Behavioral: Negative for agitation, hallucinations and suicidal ideas         Physical Exam  Physical Exam  Constitutional:       Appearance: She is well-developed  HENT:      Head: Normocephalic and atraumatic  Eyes:      Pupils: Pupils are equal, round, and reactive to light  Neck:      Vascular: No JVD  Trachea: No tracheal deviation  Cardiovascular:      Rate and Rhythm: Normal rate and regular rhythm  Pulmonary:      Effort: No tachypnea, accessory muscle usage or respiratory distress  Abdominal:      General: There is no distension  Musculoskeletal:      Right lower leg: Normal       Left lower leg: Normal    Skin:     General: Skin is warm  Capillary Refill: Capillary refill takes less than 2 seconds  Neurological:      Mental Status: She is alert  Comments: Patient has a sporadic tremor in all 4 extremities which has no true focality  Patient states she has paresthesias from the eyes down  She will report intermittent changes in sensation when comparing right to left when assessed multiple times  On 1 evaluation it was the left side of the face in the right arm that had increased sensation  On his subsequent evaluation it was the right side of the face in the left arm that had increased sensation  There was another examination were she states all his extremities and face felt the scene  Patient has a normal strength, cerebellar exam   Normal gait  Normal mental status    Normal cranial nerve exam    Psychiatric:         Mood and Affect: Mood normal          Behavior: Behavior normal          Vital Signs  ED Triage Vitals [06/01/22 1154]   Temp Pulse Respirations Blood Pressure SpO2   -- 77 20 150/68 99 %      Temp Source Heart Rate Source Patient Position - Orthostatic VS BP Location FiO2 (%)   Oral Monitor Sitting Right arm --      Pain Score       No Pain           Vitals:    06/01/22 1154 06/01/22 1442   BP: 150/68 127/66   Pulse: 77 75   Patient Position - Orthostatic VS: Sitting Lying         Visual Acuity      ED Medications  Medications   sodium chloride 0 9 % bolus 1,000 mL (1,000 mL Intravenous New Bag 6/1/22 1333)   OLANZapine (ZyPREXA ZYDIS) dispersible tablet 5 mg (5 mg Oral Given 6/1/22 1332)       Diagnostic Studies  Results Reviewed     Procedure Component Value Units Date/Time    POCT pregnancy, urine [[id number]]  (Normal) Resulted: 06/01/22 1510    Lab Status: Final result Updated: 06/01/22 1510     EXT PREG TEST UR (Ref: Negative) NEGATIVE     Control VALID    Urine Macroscopic, POC [[id number]] Collected: 06/01/22 1508    Lab Status: Final result Specimen: Urine Updated: 06/01/22 1509     Color, UA Yellow     Clarity, UA Clear     pH, UA 7 0     Leukocytes, UA Negative     Nitrite, UA Negative     Protein, UA Negative mg/dl      Glucose, UA Negative mg/dl      Ketones, UA Negative mg/dl      Urobilinogen, UA 0 2 E U /dl      Bilirubin, UA Negative     Blood, UA Negative     Specific Gravity, UA 1 015    Narrative:      CLINITEK RESULT    Comprehensive metabolic panel [[id number]] Collected: 06/01/22 1335    Lab Status: Final result Specimen: Blood from Arm, Right Updated: 06/01/22 1401     Sodium 140 mmol/L      Potassium 3 6 mmol/L      Chloride 104 mmol/L      CO2 25 mmol/L      ANION GAP 11 mmol/L      BUN 15 mg/dL      Creatinine 0 87 mg/dL      Glucose 88 mg/dL      Calcium 9 3 mg/dL      AST 28 U/L      ALT 37 U/L      Alkaline Phosphatase 58 U/L      Total Protein 7 5 g/dL      Albumin 3 9 g/dL      Total Bilirubin 0 37 mg/dL      eGFR 92 ml/min/1 73sq m     Narrative:      [facility] guidelines for Chronic Kidney Disease (CKD):     Stage 1 with normal or high GFR (GFR > 90 mL/min/1 73 square meters)    Stage 2 Mild CKD (GFR = 60-89 mL/min/1 73 square meters)    Stage 3A Moderate CKD (GFR = 45-59 mL/min/1 73 square meters)    Stage 3B Moderate CKD (GFR = 30-44 mL/min/1 73 square meters)    Stage 4 Severe CKD (GFR = 15-29 mL/min/1 73 square meters)    Stage 5 End Stage CKD (GFR <15 mL/min/1 73 square meters)  Note: GFR calculation is accurate only with a steady state creatinine CBC and differential [[id number]] Collected: 06/01/22 1335    Lab Status: Final result Specimen: Blood from Arm, Right Updated: 06/01/22 1343     WBC 5 65 Thousand/uL      RBC 4 94 Million/uL      Hemoglobin 14 1 g/dL      Hematocrit 44 3 %      MCV 90 fL      MCH 28 5 pg      MCHC 31 8 g/dL      RDW 12 4 %      MPV 9 7 fL      Platelets 418 Thousands/uL      nRBC 0 /100 WBCs      Neutrophils Relative 58 %      Immat GRANS % 0 %      Lymphocytes Relative 36 %      Monocytes Relative 5 %      Eosinophils Relative 1 %      Basophils Relative 0 %      Neutrophils Absolute 3 21 Thousands/µL      Immature Grans Absolute 0 01 Thousand/uL      Lymphocytes Absolute 2 03 Thousands/µL      Monocytes Absolute 0 30 Thousand/µL      Eosinophils Absolute 0 08 Thousand/µL      Basophils Absolute 0 02 Thousands/µL                  No orders to display              Procedures  Procedures         ED Course  ED Course as of 06/01/22 1518   Wed Jun 01, 2022   1511 Work upreviewed and benign  Will reassure, , tx symptoms, outpatient follow up  MDM  Number of Diagnoses or Management Options  Diagnosis management comments: Paresthesias in tremor without focal findings suggest acute CNS pathology  Will check labs electrolyte abnormality, urine dip urine pregnancy, treat for anxiety  Workup is negative will reassure, counseled discharge home with neuro/pcp f/u      Disposition  Final diagnoses:   Paresthesia   Shaking     Time reflects when diagnosis was documented in both MDM as applicable and the Disposition within this note     Time User Action Codes Description Comment    6/1/2022  3:12 PM Skrileyt Riky Add [R20 2] Paresthesia     6/1/2022  3:12 PM Skrileyt Riky Add [R25 1] Shaking       ED Disposition     ED Disposition   Discharge    Condition   Stable    Date/Time   Wed Jun 1, 2022  3:12 PM    Comment   Dmitriy Slice discharge to home/self care                 Follow-up Information     Follow up With Specialties Details Why Contact Info Additional Information    Lizabeth Neville DO Family Medicine Schedule an appointment as soon as possible for a visit in 2 days  [facility] [address] [employer] [facility] Schedule an appointment as soon as possible for a visit in 2 days  [address] [facility] [address] [phone] [facility], [address], [id number] [phone]          Patient's Medications   Discharge Prescriptions    No medications on file           PDMP Review     None          ED Provider  Electronically Signed by           Jerrica Villa MD  06/01/22 6180

## 2022-06-01 NOTE — Clinical Note
accompanied Dino King to the emergency department on 6/1/2022  Return date if applicable: 11/98/5073        If you have any questions or concerns, please don't hesitate to call        Toño North MD

## 2022-06-01 NOTE — TELEPHONE ENCOUNTER
Yanet Berry called and states She is having numbness radiating to her feet and hands, and is increasing  Pt  Asked if She should take her Nuvaring out  Pt  told remove immediately   Pt  To go to the ER pt  States her boss can drive her to the ER

## 2022-06-01 NOTE — Clinical Note
Dmitriy Castro was seen and treated in our emergency department on 6/1/2022  No restrictions            Diagnosis:     Aleah Waters  may return to work on return date  She may return on this date: 06/02/2022         If you have any questions or concerns, please don't hesitate to call        Jaida Leggett MD    ______________________________           _______________          _______________  Hospital Representative                              Date                                Time

## 2022-10-10 DIAGNOSIS — N76.0 BV (BACTERIAL VAGINOSIS): Primary | ICD-10-CM

## 2022-10-10 DIAGNOSIS — B96.89 BV (BACTERIAL VAGINOSIS): Primary | ICD-10-CM

## 2022-10-10 RX ORDER — CLINDAMYCIN PHOSPHATE 20 MG/G
1 CREAM VAGINAL
Qty: 40 G | Refills: 0 | Status: SHIPPED | OUTPATIENT
Start: 2022-10-10 | End: 2022-10-12 | Stop reason: SDUPTHER

## 2022-10-12 DIAGNOSIS — N76.0 BV (BACTERIAL VAGINOSIS): ICD-10-CM

## 2022-10-12 DIAGNOSIS — B96.89 BV (BACTERIAL VAGINOSIS): ICD-10-CM

## 2022-10-12 RX ORDER — CLINDAMYCIN PHOSPHATE 20 MG/G
1 CREAM VAGINAL
Qty: 40 G | Refills: 0 | Status: SHIPPED | OUTPATIENT
Start: 2022-10-12 | End: 2022-10-27 | Stop reason: ALTCHOICE

## 2022-10-19 ENCOUNTER — ANNUAL EXAM (OUTPATIENT)
Dept: GYNECOLOGY | Facility: CLINIC | Age: 27
End: 2022-10-19
Payer: COMMERCIAL

## 2022-10-19 ENCOUNTER — ULTRASOUND (OUTPATIENT)
Dept: GYNECOLOGY | Facility: CLINIC | Age: 27
End: 2022-10-19

## 2022-10-19 DIAGNOSIS — Z15.01 BRCA2 GENE MUTATION POSITIVE: ICD-10-CM

## 2022-10-19 DIAGNOSIS — Z15.09 BRCA GENE MUTATION POSITIVE IN FEMALE: Primary | ICD-10-CM

## 2022-10-19 DIAGNOSIS — Z01.419 ENCOUNTER FOR GYNECOLOGICAL EXAMINATION WITH PAPANICOLAOU SMEAR OF CERVIX: ICD-10-CM

## 2022-10-19 DIAGNOSIS — N93.9 ABNORMAL UTERINE BLEEDING (AUB): ICD-10-CM

## 2022-10-19 DIAGNOSIS — Z15.01 BRCA GENE MUTATION POSITIVE IN FEMALE: Primary | ICD-10-CM

## 2022-10-19 DIAGNOSIS — Z15.02 BRCA GENE MUTATION POSITIVE IN FEMALE: Primary | ICD-10-CM

## 2022-10-19 DIAGNOSIS — Z01.419 ENCOUNTER FOR GYNECOLOGICAL EXAMINATION WITHOUT ABNORMAL FINDING: Primary | ICD-10-CM

## 2022-10-19 DIAGNOSIS — Z15.09 BRCA2 GENE MUTATION POSITIVE: ICD-10-CM

## 2022-10-19 PROCEDURE — G0145 SCR C/V CYTO,THINLAYER,RESCR: HCPCS | Performed by: OBSTETRICS & GYNECOLOGY

## 2022-10-19 PROCEDURE — 99395 PREV VISIT EST AGE 18-39: CPT | Performed by: OBSTETRICS & GYNECOLOGY

## 2022-10-19 PROCEDURE — 76830 TRANSVAGINAL US NON-OB: CPT | Performed by: OBSTETRICS & GYNECOLOGY

## 2022-10-19 NOTE — PROGRESS NOTES
AMB US Pelvic Non OB    Date/Time: 10/19/2022 10:09 AM  Performed by: Kaila Quezada  Authorized by: Jack Appiah DO   Universal Protocol:  Patient identity confirmed: verbally with patient      Procedure details:     Indications comment:  + BRCA    Technique:  Transvaginal US, Non-OB    Position: lithotomy exam    Uterine findings:     Length (cm): 7    Height (cm):  3 7    Width (cm):  5 8    Endometrial stripe: identified      Endometrium thickness (mm):  6 13  Left ovary findings:     Left ovary:  Visualized    Length (cm): 2 65    Height (cm): 1 47    Width (cm): 2 28  Right ovary findings:     Right ovary:  Visualized    Length (cm): 2 78    Height (cm): 1 3    Width (cm): 2 45  Other findings:     Free pelvic fluid: identified    Post-Procedure Details:     Impression:  Anteverted uterus and bilateral ovaries appear within normal limits  Ovaries demonstrate multiple follicles under 1cm, the largest on each ovary; RT 1 3cm and LT 1 1cm  Minimal free fluid is noted in the cul de sac  Tolerance: Tolerated well, no immediate complications    Complications: no complications    Additional Procedure Comments:        GE Voluson P8 transvaginal transducer RIC5-RA with Serial Number [id number] was used during procedure and subsequently cleaned with high level disinfection utilizing the ONEPLE       Ultrasound performed at:     [facility] for [address]  [address], [address]   Phone: [phone]  Fax:  [phone]

## 2022-10-19 NOTE — PROGRESS NOTES
Assessment/Plan:       Diagnoses and all orders for this visit:    Encounter for gynecological examination without abnormal finding    Abnormal uterine bleeding (AUB): If this reoccurs she will return to the office    BRCA2 gene mutation positive        Subjective:      Patient ID: Dhiraj Jimenes is a 32 y o  female  HPI patient presents for annual examination  She is BRCA2 positive  She is status post bilateral mastectomy with reconstructive surgery  For contraception partner uses condoms  She states she had a normal menses September 16 to 22nd  On October 6th she began bleeding occasionally heavy bleeding lasted up until yesterday  This was associated with some pelvic pain and lower back pain  Presently the pain has resolved as has the low back ache  She denies any dysuria, hematuria urgency  She did have an increased frequency of urination which has also resolved  No GI complaints  Also presents today for T VS secondary to BRCA2 gene mutation positive    Recently treated for BV and candidiasis    The following portions of the patient's history were reviewed and updated as appropriate:   She  has a past medical history of Anaphylaxis, Asthma, BRCA positive, BRCA2 gene mutation positive (9/30/2019), Cervical stenosis, Cervical stenosis (uterine cervix), Dysmenorrhea, GERD (gastroesophageal reflux disease), Hives, Inguinal mass, Irritable bowel, Menorrhagia, Pap smear for cervical cancer screening (05/31/2017), PONV (postoperative nausea and vomiting), and Vitamin D deficiency    She   Patient Active Problem List    Diagnosis Date Noted   • Seborrhea capitis 10/19/2019   • BRCA2 gene mutation positive 09/30/2019   • Irritant contact dermatitis due to plants, except food 03/23/2019   • 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 03/23/2019   • Angio-edema 03/23/2019   • Allergic asthma, mild intermittent, uncomplicated 99/63/6544   • Allergic rhinitis caused by mold 03/23/2019   • Seasonal allergic rhinitis due to pollen 03/23/2019   • Allergic rhinitis due to animal (cat) (dog) hair and dander 03/23/2019   • Acute atopic conjunctivitis, bilateral 03/23/2019   • Intrinsic eczema 03/23/2019   • PFAS (pollen-food allergy syndrome), initial encounter 03/23/2019   • Latex allergy, contact dermatitis 03/23/2019   • Irritant contact dermatitis due to plant 03/21/2019   • Exercise-induced asthma 05/28/2015     She  has a past surgical history that includes [place] tooth extraction (Bilateral); Knee cartilage surgery; US guided breast biopsy right complete (Right, 12/16/2019); US guided breast biopsy left complete (Left, 12/16/2019); Knee arthroscopy (Right); Colonoscopy; Cyst Removal (Right); pr mastectomy, simple, complete (Bilateral, 1/30/2020); pr tissue expander placement breast reconstruction (Bilateral, 1/30/2020); pr implnt bio implnt for soft tissue reinforcement (Bilateral, 1/30/2020); pr adj tiss xfer trunk 10 1-30 sqcm (Bilateral, 1/30/2020); pr insj/rplcmt breast implant sep day mastectomy (Bilateral, 6/18/2020); Mastectomy (Bilateral); and pr grafting of autologous fat by lipo 50 cc or less (Bilateral, 10/22/2020)  Her family history includes Allergies in her brother; Anesthesia problems in her mother; BRCA 1/2 in her brother, maternal uncle, and mother; Brain cancer in her maternal grandmother; Heart disease in her father; Hypertension in her father; Ovarian cancer (age of onset: 48) in her maternal grandmother  She  reports that she has never smoked  She has never used smokeless tobacco  She reports current alcohol use of about 4 0 standard drinks of alcohol per week  She reports that she does not use drugs    Current Outpatient Medications   Medication Sig Dispense Refill   • clindamycin (CLEOCIN) 2 % vaginal cream Insert 1 applicator into the vagina daily at bedtime 40 g 0   • EPINEPHrine (EPIPEN) 0 3 mg/0 3 mL SOAJ Inject 0 3 mg into a muscle as needed for anaphylaxis (Patient not taking: Reported on 4/13/2022 )  1   • etonogestrel-ethinyl estradiol (NuvaRing) 0 12-0 015 MG/24HR vaginal ring Insert vaginally and leave in place for 3 consecutive weeks, then remove for 1 week  3 each 3   • ipratropium (ATROVENT) 0 03 % nasal spray 2 sprays into each nostril 4 (four) times a day as needed for rhinitis 30 mL 3   • pantoprazole (PROTONIX) 40 mg tablet Take 40 mg by mouth daily       No current facility-administered medications for this visit  Facility-Administered Medications Ordered in Other Visits   Medication Dose Route Frequency Provider Last Rate Last Admin   • bacitracin 50,000 Units in lactated ringers 1,000 mL irrigation bag   Irrigation Once Hardik Quiroz MD       • lactated ringers infusion  125 mL/hr Intravenous Continuous Shad MD Isaac   Stopped at 10/22/20 1706   • lidocaine (PF) (XYLOCAINE-MPF) 1 % 50 mL, EPINEPHrine PF (ADRENALIN) 1 mL in lactated ringers 1,000 mL irrigation   Irrigation Once Hardik Quiroz MD       • [facility]etron [facility]) injection 4 mg  4 mg Intravenous Q8H PRN Hradik Quiroz MD       • oxyCODONE-acetaminophen (PERCOCET) 5-325 mg per tablet 2 tablet  2 tablet Oral Q4H PRN Hardik Quiroz MD   1 tablet at 10/22/20 0982     Current Outpatient Medications on File Prior to Visit   Medication Sig   • clindamycin (CLEOCIN) 2 % vaginal cream Insert 1 applicator into the vagina daily at bedtime   • EPINEPHrine (EPIPEN) 0 3 mg/0 3 mL SOAJ Inject 0 3 mg into a muscle as needed for anaphylaxis (Patient not taking: Reported on 4/13/2022 )   • etonogestrel-ethinyl estradiol (NuvaRing) 0 12-0 015 MG/24HR vaginal ring Insert vaginally and leave in place for 3 consecutive weeks, then remove for 1 week     • ipratropium (ATROVENT) 0 03 % nasal spray 2 sprays into each nostril 4 (four) times a day as needed for rhinitis   • pantoprazole (PROTONIX) 40 mg tablet Take 40 mg by mouth daily     Current Facility-Administered Medications on File Prior to Visit   Medication   • bacitracin 50,000 Units in lactated ringers 1,000 mL irrigation bag   • lactated ringers infusion   • lidocaine (PF) (XYLOCAINE-MPF) 1 % 50 mL, EPINEPHrine PF (ADRENALIN) 1 mL in lactated ringers 1,000 mL irrigation   • ondansetron (ZOFRAN) injection 4 mg   • oxyCODONE-acetaminophen (PERCOCET) 5-325 mg per tablet 2 tablet     She is allergic to nuts - food allergy, peach [prunus persica], latex, nsaids, other, and tolmetin       Review of Systems   Constitutional: Negative  HENT: Negative for sore throat and trouble swallowing  Gastrointestinal: Negative  Genitourinary: Positive for menstrual problem  See HPI         Objective: There were no vitals taken for this visit  Physical Exam  Vitals reviewed  Constitutional:       Appearance: Normal appearance  She is normal weight  Cardiovascular:      Rate and Rhythm: Normal rate and regular rhythm  Pulses: Normal pulses  Heart sounds: Normal heart sounds  No murmur heard  Pulmonary:      Effort: Pulmonary effort is normal  No respiratory distress  Breath sounds: Normal breath sounds  Chest:   Breasts:      Right: Absent  No skin change, axillary adenopathy or supraclavicular adenopathy  Left: Absent  No skin change, axillary adenopathy or supraclavicular adenopathy  Comments: Bilateral breast reconstruction  Abdominal:      General: There is no distension  Palpations: Abdomen is soft  There is no mass  Tenderness: There is no abdominal tenderness  There is no guarding or rebound  Hernia: No hernia is present  There is no hernia in the left inguinal area or right inguinal area  Genitourinary:     General: Normal vulva  Labia:         Right: No rash, tenderness or lesion  Left: No rash, tenderness or lesion  Vagina: Normal       Cervix: Normal       Uterus: Normal        Adnexa:         Right: No mass, tenderness or fullness  Left: No mass, tenderness or fullness       Musculoskeletal: Cervical back: Normal range of motion and neck supple  No tenderness  Lymphadenopathy:      Cervical: No cervical adenopathy  Upper Body:      Right upper body: No supraclavicular, axillary or pectoral adenopathy  Left upper body: No supraclavicular, axillary or pectoral adenopathy  Lower Body: No right inguinal adenopathy  No left inguinal adenopathy  Neurological:      Mental Status: She is alert  AMB US Pelvic Non OB     Date/Time: 10/19/2022 10:09 AM  Performed by: Coleman Valdes  Authorized by: Loki Mac DO   Universal Protocol:  Patient identity confirmed: verbally with patient        Procedure details:     Indications comment:  + BRCA    Technique:  Transvaginal US, Non-OB    Position: lithotomy exam    Uterine findings:     Length (cm): 7    Height (cm):  3 7    Width (cm):  5 8    Endometrial stripe: identified      Endometrium thickness (mm):  6 13  Left ovary findings:     Left ovary:  Visualized    Length (cm): 2 65    Height (cm): 1 47    Width (cm): 2 28  Right ovary findings:     Right ovary:  Visualized    Length (cm): 2 78    Height (cm): 1 3    Width (cm): 2 45  Other findings:     Free pelvic fluid: identified    Post-Procedure Details:     Impression:  Anteverted uterus and bilateral ovaries appear within normal limits  Ovaries demonstrate multiple follicles under 1cm, the largest on each ovary; RT 1 3cm and LT 1 1cm  Minimal free fluid is noted in the cul de sac  Tolerance:   Tolerated well, no immediate complications    Complications: no complications

## 2022-10-26 LAB
LAB AP GYN PRIMARY INTERPRETATION: NORMAL
Lab: NORMAL

## 2022-10-27 RX ORDER — CEPHALEXIN 500 MG/1
CAPSULE ORAL
COMMUNITY
Start: 2022-10-24

## 2022-10-27 RX ORDER — ALBUTEROL SULFATE 90 UG/1
2 AEROSOL, METERED RESPIRATORY (INHALATION) EVERY 6 HOURS PRN
COMMUNITY

## 2022-10-27 RX ORDER — OXYCODONE HYDROCHLORIDE AND ACETAMINOPHEN 5; 325 MG/1; MG/1
TABLET ORAL
COMMUNITY
Start: 2022-10-24

## 2022-10-27 NOTE — PRE-PROCEDURE INSTRUCTIONS
Pre-Surgery Instructions:   Medication Instructions   • albuterol (PROVENTIL HFA,VENTOLIN HFA) 90 mcg/act inhaler Uses PRN- OK to take day of surgery   • Multiple Vitamins-Minerals (WOMENS MULTIVITAMIN PO) Stop taking 7 days prior to surgery  • pantoprazole (PROTONIX) 40 mg tablet Take day of surgery  All pre-op instructions reviewed as per [facility] My Surgery Experience w/ pt verb understanding  Inst NPO post MN night before sx except sips water w/ meds morning of sx  Instructed to avoid all ASA/NSAIDs and OTC Vit/Supp from now until after surgery per anesthesia guidelines  Tylenol ok prn  Received pre-op showering instructions and CHG from surgeon office, reviewed process at time of call  Current hospital visitor & masking policy reviewed  [facility] will receive phone call w/ time to report on DOS btwn 2-8 pm afternoon/evening before surgery from hospital nursing staff  Pt verbalized an understanding of all instructions reviewed and offers no concerns at this time

## 2022-11-01 ENCOUNTER — ANESTHESIA EVENT (OUTPATIENT)
Dept: PERIOP | Facility: HOSPITAL | Age: 27
End: 2022-11-01

## 2022-11-02 ENCOUNTER — ANESTHESIA (OUTPATIENT)
Dept: PERIOP | Facility: HOSPITAL | Age: 27
End: 2022-11-02

## 2022-11-02 ENCOUNTER — HOSPITAL ENCOUNTER (OUTPATIENT)
Facility: HOSPITAL | Age: 27
Setting detail: OUTPATIENT SURGERY
Discharge: HOME | End: 2022-11-02
Attending: PLASTIC SURGERY | Admitting: PLASTIC SURGERY

## 2022-11-02 VITALS
RESPIRATION RATE: 20 BRPM | WEIGHT: 134 LBS | BODY MASS INDEX: 25.3 KG/M2 | TEMPERATURE: 97.6 F | HEIGHT: 61 IN | SYSTOLIC BLOOD PRESSURE: 124 MMHG | HEART RATE: 84 BPM | OXYGEN SATURATION: 100 % | DIASTOLIC BLOOD PRESSURE: 75 MMHG

## 2022-11-02 DIAGNOSIS — N64.82 HYPOPLASIA OF BREAST: ICD-10-CM

## 2022-11-02 DIAGNOSIS — N65.0 DEFORMITY OF RECONSTRUCTED BREAST: ICD-10-CM

## 2022-11-02 LAB
EXT PREGNANCY TEST URINE: NEGATIVE
EXT. CONTROL: NORMAL

## 2022-11-02 DEVICE — SMOOTH HIGH PROFILE, 775CC  SMOOTH ROUND SILICONE
Type: IMPLANTABLE DEVICE | Site: BREAST | Status: FUNCTIONAL
Brand: MENTOR MEMORYGEL BOOST BREAST IMPLANT

## 2022-11-02 RX ORDER — LIDOCAINE HYDROCHLORIDE 10 MG/ML
INJECTION, SOLUTION EPIDURAL; INFILTRATION; INTRACAUDAL; PERINEURAL AS NEEDED
Status: DISCONTINUED | OUTPATIENT
Start: 2022-11-02 | End: 2022-11-02 | Stop reason: HOSPADM

## 2022-11-02 RX ORDER — ONDANSETRON 2 MG/ML
4 INJECTION INTRAMUSCULAR; INTRAVENOUS ONCE AS NEEDED
Status: DISCONTINUED | OUTPATIENT
Start: 2022-11-02 | End: 2022-11-02 | Stop reason: HOSPADM

## 2022-11-02 RX ORDER — HYDROMORPHONE HCL IN WATER/PF 6 MG/30 ML
0.2 PATIENT CONTROLLED ANALGESIA SYRINGE INTRAVENOUS
Status: DISCONTINUED | OUTPATIENT
Start: 2022-11-02 | End: 2022-11-02 | Stop reason: HOSPADM

## 2022-11-02 RX ORDER — OXYCODONE HYDROCHLORIDE 5 MG/1
10 TABLET ORAL EVERY 4 HOURS PRN
Status: DISCONTINUED | OUTPATIENT
Start: 2022-11-02 | End: 2022-11-02 | Stop reason: HOSPADM

## 2022-11-02 RX ORDER — MEPERIDINE HYDROCHLORIDE 25 MG/ML
12.5 INJECTION INTRAMUSCULAR; INTRAVENOUS; SUBCUTANEOUS ONCE
Status: COMPLETED | OUTPATIENT
Start: 2022-11-02 | End: 2022-11-02

## 2022-11-02 RX ORDER — ONDANSETRON 2 MG/ML
4 INJECTION INTRAMUSCULAR; INTRAVENOUS EVERY 8 HOURS PRN
Status: DISCONTINUED | OUTPATIENT
Start: 2022-11-02 | End: 2022-11-02 | Stop reason: HOSPADM

## 2022-11-02 RX ORDER — FENTANYL CITRATE/PF 50 MCG/ML
25 SYRINGE (ML) INJECTION
Status: DISCONTINUED | OUTPATIENT
Start: 2022-11-02 | End: 2022-11-02 | Stop reason: HOSPADM

## 2022-11-02 RX ORDER — ACETAMINOPHEN 325 MG/1
650 TABLET ORAL EVERY 6 HOURS PRN
Status: DISCONTINUED | OUTPATIENT
Start: 2022-11-02 | End: 2022-11-02 | Stop reason: HOSPADM

## 2022-11-02 RX ORDER — PROMETHAZINE HYDROCHLORIDE 25 MG/ML
INJECTION, SOLUTION INTRAMUSCULAR; INTRAVENOUS AS NEEDED
Status: DISCONTINUED | OUTPATIENT
Start: 2022-11-02 | End: 2022-11-02

## 2022-11-02 RX ORDER — PROPOFOL 10 MG/ML
INJECTION, EMULSION INTRAVENOUS AS NEEDED
Status: DISCONTINUED | OUTPATIENT
Start: 2022-11-02 | End: 2022-11-02

## 2022-11-02 RX ORDER — ONDANSETRON 4 MG/1
4 TABLET, ORALLY DISINTEGRATING ORAL ONCE
Status: COMPLETED | OUTPATIENT
Start: 2022-11-02 | End: 2022-11-02

## 2022-11-02 RX ORDER — FENTANYL CITRATE 50 UG/ML
INJECTION, SOLUTION INTRAMUSCULAR; INTRAVENOUS AS NEEDED
Status: DISCONTINUED | OUTPATIENT
Start: 2022-11-02 | End: 2022-11-02

## 2022-11-02 RX ORDER — BUPIVACAINE HYDROCHLORIDE AND EPINEPHRINE 5; 5 MG/ML; UG/ML
INJECTION, SOLUTION EPIDURAL; INTRACAUDAL; PERINEURAL AS NEEDED
Status: DISCONTINUED | OUTPATIENT
Start: 2022-11-02 | End: 2022-11-02 | Stop reason: HOSPADM

## 2022-11-02 RX ORDER — OXYCODONE HYDROCHLORIDE 5 MG/1
5 TABLET ORAL EVERY 4 HOURS PRN
Status: DISCONTINUED | OUTPATIENT
Start: 2022-11-02 | End: 2022-11-02 | Stop reason: HOSPADM

## 2022-11-02 RX ORDER — DEXAMETHASONE SODIUM PHOSPHATE 10 MG/ML
INJECTION, SOLUTION INTRAMUSCULAR; INTRAVENOUS AS NEEDED
Status: DISCONTINUED | OUTPATIENT
Start: 2022-11-02 | End: 2022-11-02

## 2022-11-02 RX ORDER — GABAPENTIN 300 MG/1
300 CAPSULE ORAL ONCE
Status: COMPLETED | OUTPATIENT
Start: 2022-11-02 | End: 2022-11-02

## 2022-11-02 RX ORDER — ONDANSETRON 2 MG/ML
INJECTION INTRAMUSCULAR; INTRAVENOUS AS NEEDED
Status: DISCONTINUED | OUTPATIENT
Start: 2022-11-02 | End: 2022-11-02

## 2022-11-02 RX ORDER — SODIUM CHLORIDE, SODIUM LACTATE, POTASSIUM CHLORIDE, CALCIUM CHLORIDE 600; 310; 30; 20 MG/100ML; MG/100ML; MG/100ML; MG/100ML
50 INJECTION, SOLUTION INTRAVENOUS CONTINUOUS
Status: DISCONTINUED | OUTPATIENT
Start: 2022-11-02 | End: 2022-11-02 | Stop reason: HOSPADM

## 2022-11-02 RX ORDER — CEFAZOLIN SODIUM 1 G/50ML
1000 SOLUTION INTRAVENOUS ONCE
Status: COMPLETED | OUTPATIENT
Start: 2022-11-02 | End: 2022-11-02

## 2022-11-02 RX ORDER — ACETAMINOPHEN 325 MG/1
650 TABLET ORAL ONCE
Status: COMPLETED | OUTPATIENT
Start: 2022-11-02 | End: 2022-11-02

## 2022-11-02 RX ORDER — MIDAZOLAM HYDROCHLORIDE 2 MG/2ML
INJECTION, SOLUTION INTRAMUSCULAR; INTRAVENOUS AS NEEDED
Status: DISCONTINUED | OUTPATIENT
Start: 2022-11-02 | End: 2022-11-02

## 2022-11-02 RX ADMIN — FENTANYL CITRATE 50 MCG: 50 INJECTION, SOLUTION INTRAMUSCULAR; INTRAVENOUS at 09:20

## 2022-11-02 RX ADMIN — SODIUM CHLORIDE, SODIUM LACTATE, POTASSIUM CHLORIDE, AND CALCIUM CHLORIDE: .6; .31; .03; .02 INJECTION, SOLUTION INTRAVENOUS at 09:21

## 2022-11-02 RX ADMIN — GABAPENTIN 300 MG: 300 CAPSULE ORAL at 06:23

## 2022-11-02 RX ADMIN — MIDAZOLAM 2 MG: 1 INJECTION INTRAMUSCULAR; INTRAVENOUS at 07:31

## 2022-11-02 RX ADMIN — PROMETHAZINE HYDROCHLORIDE 6.25 MG: 25 INJECTION INTRAMUSCULAR; INTRAVENOUS at 07:42

## 2022-11-02 RX ADMIN — OXYCODONE HYDROCHLORIDE 5 MG: 5 TABLET ORAL at 10:31

## 2022-11-02 RX ADMIN — ACETAMINOPHEN 650 MG: 325 TABLET ORAL at 06:22

## 2022-11-02 RX ADMIN — ONDANSETRON 4 MG: 2 INJECTION INTRAMUSCULAR; INTRAVENOUS at 07:37

## 2022-11-02 RX ADMIN — MEPERIDINE HYDROCHLORIDE 12.5 MG: 25 INJECTION INTRAMUSCULAR; INTRAVENOUS; SUBCUTANEOUS at 09:40

## 2022-11-02 RX ADMIN — FENTANYL CITRATE 25 MCG: 50 INJECTION, SOLUTION INTRAMUSCULAR; INTRAVENOUS at 09:06

## 2022-11-02 RX ADMIN — SODIUM CHLORIDE, SODIUM LACTATE, POTASSIUM CHLORIDE, AND CALCIUM CHLORIDE 50 ML/HR: .6; .31; .03; .02 INJECTION, SOLUTION INTRAVENOUS at 06:36

## 2022-11-02 RX ADMIN — FENTANYL CITRATE 25 MCG: 50 INJECTION, SOLUTION INTRAMUSCULAR; INTRAVENOUS at 08:26

## 2022-11-02 RX ADMIN — FENTANYL CITRATE 25 MCG: 50 INJECTION, SOLUTION INTRAMUSCULAR; INTRAVENOUS at 08:07

## 2022-11-02 RX ADMIN — DEXAMETHASONE SODIUM PHOSPHATE 10 MG: 10 INJECTION, SOLUTION INTRAMUSCULAR; INTRAVENOUS at 07:37

## 2022-11-02 RX ADMIN — CEFAZOLIN SODIUM 1000 MG: 1 SOLUTION INTRAVENOUS at 07:36

## 2022-11-02 RX ADMIN — FENTANYL CITRATE 25 MCG: 50 INJECTION, SOLUTION INTRAMUSCULAR; INTRAVENOUS at 07:36

## 2022-11-02 RX ADMIN — FENTANYL CITRATE 25 MCG: 50 INJECTION, SOLUTION INTRAMUSCULAR; INTRAVENOUS at 08:00

## 2022-11-02 RX ADMIN — ONDANSETRON 4 MG: 4 TABLET, ORALLY DISINTEGRATING ORAL at 06:24

## 2022-11-02 RX ADMIN — FENTANYL CITRATE 25 MCG: 50 INJECTION, SOLUTION INTRAMUSCULAR; INTRAVENOUS at 07:44

## 2022-11-02 RX ADMIN — PROPOFOL 200 MG: 10 INJECTION, EMULSION INTRAVENOUS at 07:33

## 2022-11-02 NOTE — ANESTHESIA POSTPROCEDURE EVALUATION
Post-Op Assessment Note    CV Status:  Stable  Pain Score: 0    Pain management: adequate     Mental Status:  Alert and awake   Hydration Status:  Euvolemic   PONV Controlled:  Controlled   Airway Patency:  Patent      Post Op Vitals Reviewed: Yes      Staff: CRNA         No complications documented      BP (!) 187/96 (11/02/22 0924)    Temp (!) 96 4 °F (35 8 °C) (11/02/22 0924)    Pulse 101 (11/02/22 0924)   Resp 20 (11/02/22 0924)    SpO2 99 % (11/02/22 0924)

## 2022-11-02 NOTE — DISCHARGE SUMMARY
Discharge Summary - Medical Radha Valle 32 y o  female MRN: [id number]    [facility] PACU Room / Bed: OR POOL/OR POOL Encounter: [id number]    BRIEF OVERVIEW  Admitting Provider: Sandra Trevizo MD  Discharge Provider: Sandra Trevizo MD  Primary Care Physician at Discharge: Pricilla Mccray[address] Starkey    Discharge To: Home      Admission Date: 11/2/2022     Discharge Date: No discharge date for patient encounter  Code Status: Prior  Advance Directive and Living Will: <no information>  Power of :        Primary Discharge Diagnosis  Active Problems:    * No active hospital problems  *  Resolved Problems:    * No resolved hospital problems   *        Discharge Disposition: [address] [place] [address]    Presenting Problem/History of Present Illness  <principal problem not specified>      Discharge Condition: stable    Patient tolerated the procedure well, recovered and was discharged home in stable condition    Nasra Greene Jan 11/2/2022  9:34 AM

## 2022-11-02 NOTE — OP NOTE
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]    Procedure(s) (LRB):  BREAST CAPSULECTOMY, IMPLANT EXCHANGE (Bilateral)  EXCISION BREAST MASS X2 (Left)    Specimen(s):  ID Type Source Tests Collected by Time Destination   1 : left breast capsule  Tissue Breast, Left TISSUE EXAM Jennifer Rivera MD 2022    2 : right breast capsule Tissue Breast, Right TISSUE EXAM Jennifer Rivera MD 2022        Estimated Blood Loss:   Minimal    Drains:  * No LDAs found *    Anesthesia Type:   General    Operative Indications:  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]  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]      Operative Findings:      Complications:   None    Procedure and Technique:  The patient was marked while standing prior to surgery  The patient was brought to the operating room and placed supine on the operating room table  Time out procedure was performed, SCDs were applied and IV antibiotics were given  The chest was prepped and draped using standard surgical technique  Attention was turned to the right breast  The previous scar was excised and dissection was carried down to the capsule using electrocautery  The capsule was found to be very thick and stiff  The capsule was opened and the implant was removed  Due to the thick capsule the decision was made to perform a capsulectomy  A total capsulectomy was performed   The capsule was sent as a specimen  The pocket was opened to fit the exact dimensions of the chosen implant      Attention was turned to the left breast  The previous scar was excised and dissection was carried down to the capsule using electrocautery  The capsule was found to be very thick and stiff  The capsule was opened and the implant was removed  Due to the thick capsule the decision was made to perform a capsulectomy  A total capsulectomy was performed   The capsule was sent as a specimen  The pocket was opened to fit the exact dimensions of the chosen implant  Two oil cysts were noted and released in the areas where the patient was concerned about masses  There was no other abnormality in the area    The pockets were irrigated with antibiotic solution which was allowed to dwell for 5 minutes  Excellent hemostasis was achieved  The skin was re prepped, all surgical team members changed their gloves  Silicone sizers were prepared  The sizers were placed into the pockets and excellent shape and contour was confirmed  Apex high profile boost 260SK silicone implants were placed into the subpectoral pockets  The orientation markers were used to confirm anatomic position of the implant  The deep tissue was re approximated using 2-0 PDS suture in running technique  The skin was closed using 3-0 vicryl and 3-0 PDS suture in interrupted deep dermal technique  The superficial skin was closed using 3-0 stratafix suture in running subcuticular technique  The wounds were cleaned and dried  Skin glue was applied  Sterile gauze and a surgical bra was placed  The patient tolerated the procedure well and was taken to the recovery room in stable condition     I was present for the entire procedure and A qualified resident physician was not available    Patient Disposition:  hemodynamically stable and extubated and stable        SIGNATURE: Melodie Sellers MD  DATE: November 2, 2022  TIME: 9:31 AM

## 2022-11-02 NOTE — DISCHARGE INSTRUCTIONS
[address], [address] [facility], [address] [facility], [facility], [address] [facility] /I / [employer]       No heavy lifting >20 pounds    Do not raise hands above head    Apply antibiotic ointment to the incisions daily (neosporin)    No exercise    Consider using button up shirts so you don't have to raise your hands above your head to put the shirt on    Wear the surgical bra at all times except the shower   If the bra is tight and is leaving marks on the skin unclasp the bottom clasps of the bra    No ice or heating pack to chest    Ok to shower    No bathing    Do not lay on stomach or sides    No smoking    No pushing or pulling    No repetitive arm movements such as cleaning, gardening etc    Call the office for an appointment in 5-14 days - [phone]

## 2022-11-02 NOTE — ANESTHESIA PREPROCEDURE EVALUATION
Procedure:  BREAST CAPSULECTOMY, IMPLANT EXCHANGE (Bilateral Breast)  EXCISION BREAST MASS X2 (Left Breast)    Relevant Problems   ANESTHESIA (within normal limits)      CARDIO (within normal limits)      ENDO (within normal limits)      GI/HEPATIC (within normal limits)      /RENAL (within normal limits)      MUSCULOSKELETAL (within normal limits)      PULMONARY   (+) Allergic asthma, mild intermittent, uncomplicated   (+) Exercise-induced asthma (mild not used inhaler in month )   (-) Smoking      Other   (+) 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   (+) BRCA2 gene mutation positive        Physical Exam    Airway    Mallampati score: II  TM Distance: >3 FB  Neck ROM: full     Dental   Comment: Bottom teeth retainers ,     Cardiovascular  Cardiovascular exam normal    Pulmonary  Pulmonary exam normal     Other Findings        Anesthesia Plan  ASA Score- 2     Anesthesia Type- general with ASA Monitors  Additional Monitors:   Airway Plan: ETT  Plan Factors-Exercise tolerance (METS): >4 METS  Chart reviewed  Existing labs reviewed  Patient summary reviewed  Patient is not a current smoker  Patient not instructed to abstain from smoking on day of procedure  Patient did not smoke on day of surgery  Induction- intravenous  Postoperative Plan- Plan for postoperative opioid use  Informed Consent- Anesthetic plan and risks discussed with patient  I personally reviewed this patient with the CRNA  Discussed and agreed on the Anesthesia Plan with the CRNA  Baron Alan             No results found for: HGBA1C    Lab Results   Component Value Date     05/12/2014    K 3 6 06/01/2022     06/01/2022    CO2 25 06/01/2022    ANIONGAP 10 05/12/2014    BUN 15 06/01/2022    CREATININE 0 87 06/01/2022    GLUCOSE 88 05/12/2014    CALCIUM 9 3 06/01/2022    AST 28 06/01/2022    ALT 37 06/01/2022    ALKPHOS 58 06/01/2022    PROT 7 9 05/12/2014    BILITOT 0 38 05/12/2014    EGFR 92 06/01/2022       Lab Results   Component Value Date    WBC 5 65 06/01/2022    HGB 14 1 06/01/2022    HCT 44 3 06/01/2022    MCV 90 06/01/2022     06/01/2022   Normal sinus rhythm with sinus arrhythmia  Rightward axis  Borderline ECG  When compared with ECG of 08-JAN-2020 12:50,  Vent   rate has increased BY  30 BPM  QT has lengthened  Confirmed by Karoline Mederos ([id number]) on 6/1/2022 12:40:32 PM

## 2022-11-02 NOTE — NURSING NOTE
No distress  Ambulated to the bathroom with supervision of staff  Voided  No drainage at incision sited  Surgical bra in place

## 2023-03-24 ENCOUNTER — TELEPHONE (OUTPATIENT)
Dept: PSYCHIATRY | Facility: CLINIC | Age: 28
End: 2023-03-24

## 2023-03-24 NOTE — TELEPHONE ENCOUNTER
Patient has been added to the [facility] wait list     Confirmed Insurance: Yes [x]  Location Preference: [place]  Provider Preference: female  Virtual: Yes [x] No []      Pt is on non referral list    Pt is interested in a provider who can deal with body issues and eating disorders

## 2023-04-27 ENCOUNTER — CONSULT (OUTPATIENT)
Dept: SURGICAL ONCOLOGY | Facility: CLINIC | Age: 28
End: 2023-04-27

## 2023-04-27 VITALS
HEIGHT: 61 IN | BODY MASS INDEX: 27.21 KG/M2 | TEMPERATURE: 98.2 F | HEART RATE: 67 BPM | DIASTOLIC BLOOD PRESSURE: 76 MMHG | OXYGEN SATURATION: 100 % | SYSTOLIC BLOOD PRESSURE: 134 MMHG | RESPIRATION RATE: 16 BRPM

## 2023-04-27 DIAGNOSIS — Z15.09 BRCA2 GENE MUTATION POSITIVE: Primary | ICD-10-CM

## 2023-04-27 DIAGNOSIS — Z90.13 S/P BILATERAL MASTECTOMY: ICD-10-CM

## 2023-04-27 DIAGNOSIS — Z15.01 BRCA2 GENE MUTATION POSITIVE: Primary | ICD-10-CM

## 2023-04-27 DIAGNOSIS — N63.25 MASS OVERLAPPING MULTIPLE QUADRANTS OF LEFT BREAST: ICD-10-CM

## 2023-04-27 NOTE — PROGRESS NOTES
Surgical Oncology Consult       [address]  [facility] [place]  [address] [address]    Earlene Purvis  1995  [id number]  [address]  [facility] [place]  [address] [address]    Diagnoses and all orders for this visit:    BRCA2 gene mutation positive  -     US breast left limited (diagnostic); Future    S/P bilateral mastectomy    Mass overlapping multiple quadrants of left breast  -     US breast left limited (diagnostic); Future        Chief Complaint   Patient presents with   • Consult       Return pending ultrasound results, for Imaging - See orders  History of Present Illness: The patient presents to the office today in consultation for 2 left breast masses, as referred by her gynecologist   The patient was diagnosed with a BRCA2 gene mutation in 2018  She then underwent bilateral prophylactic mastectomy with immediate reconstruction in 2020, performed by Dr Yaz Grimaldo and Dr Catalina Dow   She has undergone multiple additional surgeries, including fat grafting and capsulectomies  She was seen for her annual exam 1 week ago, at which time Dr Merilee Bosworth palpated 2 nodules in the left breast   The patient reports she had noticed these several months ago, but assumed that they were related to her plastic surgeries  She denies any constitutional symptoms aside from fatigue  She denies any shortness of breath, cough, abdominal pain, weight loss, bone or back pain, headaches or dizziness  She has not noticed any lumps or masses elsewhere in her body  She has not had any directed breast imaging performed  Review of Systems   Constitutional: Positive for fatigue  Negative for activity change, appetite change and unexpected weight change  HENT: Negative  Respiratory: Negative  Negative for cough and shortness of breath  Cardiovascular: Negative  "  Gastrointestinal: Negative for abdominal distention, abdominal pain, nausea and vomiting  Musculoskeletal: Negative  Skin: Negative  Neurological: Negative  Negative for dizziness and headaches  Hematological: Negative  Negative for adenopathy  Psychiatric/Behavioral: Negative                Patient Active Problem List   Diagnosis   • Irritant contact dermatitis due to plant   • Irritant contact dermatitis due to plants, except food   • 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   • Angio-edema   • Allergic asthma, mild intermittent, uncomplicated   • Allergic rhinitis caused by mold   • Seasonal allergic rhinitis due to pollen   • Allergic rhinitis due to animal (cat) (dog) hair and dander   • Acute atopic conjunctivitis, bilateral   • Intrinsic eczema   • PFAS (pollen-food allergy syndrome), initial encounter   • Latex allergy, contact dermatitis   • BRCA2 gene mutation positive   • Exercise-induced asthma   • Seborrhea capitis   • S/P bilateral mastectomy   • Mass overlapping multiple quadrants of left breast     Past Medical History:   Diagnosis Date   • Anaphylaxis     tree nuts    • Asthma     \" Exercise Induced \"   • BRCA positive    • BRCA2 gene mutation positive 09/30/2019   • Cervical stenosis    • Cervical stenosis (uterine cervix)    • Dysmenorrhea    • GERD (gastroesophageal reflux disease)     pt states Heartburn only,  has had normal egd's   • Hives    • Inguinal mass     right   • Irritable bowel    • Menorrhagia    • Migraines    • Motion sickness    • Ovarian cyst August    Cyst formed post ovulation   • Pap smear for cervical cancer screening 05/31/2017    ASCUS, HPV negative   • PONV (postoperative nausea and vomiting)    • Vitamin D deficiency      Past Surgical History:   Procedure Laterality Date   • CAPSULOTOMY Bilateral 11/2/2022    Procedure: BREAST CAPSULECTOMY, IMPLANT EXCHANGE;  Surgeon: Gómez Fitzgerald MD;  Location:  MAIN OR;  Service: Plastics   • COLONOSCOPY     • CYST " REMOVAL Right     Middle Finger   • EGD     • KNEE ARTHROSCOPY Right    • KNEE CARTILAGE SURGERY     • MASTECTOMY Bilateral     2020   • ID ADJACENT TISSUE TRANSFER/REARGMT TRUNK 10 SQCM/< Left 11/2/2022    Procedure: EXCISION BREAST MASS X2;  Surgeon: Nelida Manrique MD;  Location: [address] [place] [address] MAIN OR;  Service: Plastics   • ID ADJNT TIS TRANSFR/REARRANGE TRUNK 10 1-30 0 SQCM Bilateral 01/30/2020    Procedure: TRUNK LOCAL FLAP;  Surgeon: Jose Ramon Maciel MD;  Location: [facility] Main OR;  Service: Plastics   • ID GRAFTING OF AUTOLOGOUS FAT BY LIPO 50 CC OR LESS Bilateral 10/22/2020    Procedure: BREAST FAT GRAFTING;  Surgeon: Jose Ramon Maciel MD;  Location: [facility] Main OR;  Service: Plastics   • ID IMPLNT BIO IMPLNT FOR SOFT TISSUE REINFORCEMENT Bilateral 01/30/2020    Procedure: BREAST RECONSTRUCTION WITH IMPLANT;  Surgeon: Jose Ramon Maciel MD;  Location: [facility] Main OR;  Service: Plastics   • ID INSJ/RPLCMT BREAST IMPLANT SEP DAY MASTECTOMY Bilateral 06/18/2020    Procedure: BREAST CAPSULECTOMIES; EXPANDER/IMPLANT EXCHANGE;  Surgeon: Jose Ramon Maciel MD;  Location: [facility] Main OR;  Service: Plastics   • ID MASTECTOMY SIMPLE COMPLETE Bilateral 01/30/2020    Procedure: BREAST SIMPLE MASTECTOMY;  Surgeon: Hortensia Brian MD;  Location: [facility] Main OR;  Service: Surgical Oncology   • ID TISSUE EXPANDER PLACEMENT BREAST RECONSTRUCTION Bilateral 01/30/2020    Procedure: BREAST INSERTION/PLACEMENT TISSUE EXPANDER (EXCHANGE);   Surgeon: Jose Ramon Maciel MD;  Location: [facility] Main OR;  Service: Plastics   • US GUIDED BREAST BIOPSY LEFT COMPLETE Left 12/16/2019   • US GUIDED BREAST BIOPSY RIGHT COMPLETE Right 12/16/2019   • WISDOM TOOTH EXTRACTION Bilateral      Family History   Problem Relation Age of Onset   • BRCA 1/2 Mother         Prophylactic surgery   • Anesthesia problems Mother    • Hypertension Father    • Heart disease Father    • BRCA 1/2 Brother    • Allergies Brother    • Ovarian cancer Maternal Grandmother    • Brain cancer Maternal Grandmother    • BRCA 1/2 Maternal Uncle    • Melanoma Family      Social History     Socioeconomic History   • Marital status: Single     Spouse name: Not on file   • Number of children: 0   • Years of education: Not on file   • Highest education level: Not on file   Occupational History   • Occupation: Sales      Comment: [employer]    Tobacco Use   • Smoking status: Never   • Smokeless tobacco: Never   Vaping Use   • Vaping Use: Never used   Substance and Sexual Activity   • Alcohol use:  Yes     Alcohol/week: 6 0 standard drinks     Types: 3 Glasses of wine, 3 Standard drinks or equivalent per week     Comment: Socially   • Drug use: No   • Sexual activity: Yes     Partners: Male     Birth control/protection: None     Comment: nuvaring   Other Topics Concern   • Not on file   Social History Narrative    Patient lives in a single dwelling home     [place] in the bedroom     Home is smoke free     Uses air      No humidifier     No dehumidifier     No basement     Window air conditioning         2 dogs  Justo and Vonnie Roscoe and there not allowed in the bedroom         Caffeine:  Does not consume     Chocolate occasional         Patient lives with mom and dad      Social Determinants of Health     Financial Resource Strain: Not on file   Food Insecurity: Not on file   Transportation Needs: Not on file   Physical Activity: Not on file   Stress: Not on file   Social Connections: Not on file   Intimate Partner Violence: Not on file   Housing Stability: Not on file       Current Outpatient Medications:   •  albuterol (PROVENTIL HFA,VENTOLIN HFA) 90 mcg/act inhaler, Inhale 2 puffs every 6 (six) hours as needed for wheezing, Disp: , Rfl:   •  EPINEPHrine (EPIPEN) 0 3 mg/0 3 mL SOAJ, Inject 0 3 mg into a muscle as needed for anaphylaxis, Disp: , Rfl: 1  •  Multiple Vitamins-Minerals (WOMENS MULTIVITAMIN PO), Take by mouth, Disp: , Rfl:   •  cephalexin (KEFLEX) 500 mg capsule, , Disp: , Rfl:   •  oxyCODONE-acetaminophen (PERCOCET) 5-325 "mg per tablet, , Disp: , Rfl:   No current facility-administered medications for this visit  Facility-Administered Medications Ordered in Other Visits:   •  bacitracin 50,000 Units in lactated ringers 1,000 mL irrigation bag, , Irrigation, Once, Kandace Rivera MD  •  lactated ringers infusion, 125 mL/hr, Intravenous, Continuous, Ranjit Lindo MD, Stopped at 10/22/20 1706  •  lidocaine (PF) (XYLOCAINE-MPF) 1 % 50 mL, EPINEPHrine PF (ADRENALIN) 1 mL in lactated ringers 1,000 mL irrigation, , Irrigation, Once, Kandace Rivera MD  •  ondansetron [facility] injection 4 mg, 4 mg, Intravenous, Q8H PRN, Kandace Rivera MD  •  oxyCODONE-acetaminophen (PERCOCET) 5-325 mg per tablet 2 tablet, 2 tablet, Oral, Q4H PRN, Kandace Rivera MD, 1 tablet at 10/22/20 1833  Allergies   Allergen Reactions   • Nuts - Food Allergy Anaphylaxis   • Peach [Prunus Persica] Anaphylaxis   • Latex Hives and Swelling     Localized reaction to Latex   • Nsaids Rash and Other (See Comments)   • Other Rash     Surgical glue   • Tolmetin Rash     \" Nsaid\"     Vitals:    04/27/23 1006   BP: 134/76   Pulse: 67   Resp: 16   Temp: 98 2 °F (36 8 °C)   SpO2: 100%       Physical Exam  Vitals reviewed  Constitutional:       General: She is not in acute distress  Appearance: Normal appearance  She is normal weight  She is not ill-appearing or toxic-appearing  HENT:      Head: Normocephalic and atraumatic  Nose: Nose normal       Mouth/Throat:      Mouth: Mucous membranes are moist    Eyes:      General: No scleral icterus  Extraocular Movements: Extraocular movements intact  Conjunctiva/sclera: Conjunctivae normal       Pupils: Pupils are equal, round, and reactive to light  Cardiovascular:      Rate and Rhythm: Normal rate  Pulmonary:      Effort: Pulmonary effort is normal    Chest:          Comments: Reconstructed breasts are symmetric bilaterally  Nipples are surgically absent    Right breast is smooth and even without any " masses or nodules present  Left breast has 2 firm nodules present in the upper and lateral portions of the breast   There are no skin changes present, and there is no adenopathy appreciated  Musculoskeletal:         General: Normal range of motion  Cervical back: Normal range of motion and neck supple  Lymphadenopathy:      Cervical: No cervical adenopathy  Skin:     General: Skin is warm and dry  Neurological:      General: No focal deficit present  Mental Status: She is alert and oriented to person, place, and time  Psychiatric:         Mood and Affect: Mood normal          Behavior: Behavior normal          Thought Content: Thought content normal          Judgment: Judgment normal             Discussion/Summary: This is a very pleasant 59-year-old female who presents today at the request of her gynecologist for 2 left breast masses in the setting of a BRCA2 gene mutation  These are palpable on exam today  Bedside ultrasound was performed by Dr Diana Alejo, and these do not appear to be cysts  I will send her for directed imaging of the left breast, and I will call her afterward to discuss if additional treatment is necessary  I have also reviewed her gene mutation and screening recommendations  She sees her dermatologist every 6 months faithfully  She has not started childbearing yet, but is engaged to be  later this year, and would like to start family-planning as soon as possible afterward  She would prefer to forego any prophylactic gyn surgery until after she is done having children  I have suggested a consultation with a fertility specialist, which she has already considered  She denies any family history of pancreatic cancer, therefore no pancreatic screening is indicated  I will see her back in the future pending results of her imaging  She is agreeable to the plan, all questions have been answered

## 2023-04-28 ENCOUNTER — HOSPITAL ENCOUNTER (OUTPATIENT)
Dept: MAMMOGRAPHY | Facility: CLINIC | Age: 28
Discharge: HOME | End: 2023-04-28

## 2023-04-28 ENCOUNTER — HOSPITAL ENCOUNTER (OUTPATIENT)
Dept: ULTRASOUND IMAGING | Facility: CLINIC | Age: 28
Discharge: HOME | End: 2023-04-28

## 2023-04-28 VITALS — WEIGHT: 144 LBS | BODY MASS INDEX: 27.19 KG/M2 | HEIGHT: 61 IN

## 2023-04-28 VITALS — BODY MASS INDEX: 27.19 KG/M2 | WEIGHT: 144 LBS | HEIGHT: 61 IN

## 2023-04-28 DIAGNOSIS — Z15.09 BRCA2 GENE MUTATION POSITIVE: ICD-10-CM

## 2023-04-28 DIAGNOSIS — N63.25 MASS OVERLAPPING MULTIPLE QUADRANTS OF LEFT BREAST: ICD-10-CM

## 2023-04-28 DIAGNOSIS — N63.0 BREAST LUMP IN UPPER OUTER QUADRANT: ICD-10-CM

## 2023-04-28 DIAGNOSIS — Z15.01 BRCA2 GENE MUTATION POSITIVE: ICD-10-CM

## 2023-04-28 DIAGNOSIS — N63.25 MASS OVERLAPPING MULTIPLE QUADRANTS OF LEFT BREAST: Primary | ICD-10-CM

## 2023-06-22 ENCOUNTER — TELEPHONE (OUTPATIENT)
Dept: HEMATOLOGY ONCOLOGY | Facility: CLINIC | Age: 28
End: 2023-06-22

## 2023-06-22 NOTE — TELEPHONE ENCOUNTER
I called St[facility] Posey regarding an appointment that they have scheduled with Dr Kylie George scheduled on 07/31/23     I explained to patient that the provider will be out of the office on this date and will need to reschedule the visit  Patient requests to be contacted at a later time to reschedule as she is not near her calendar at the moment

## 2023-06-30 ENCOUNTER — TELEPHONE (OUTPATIENT)
Dept: HEMATOLOGY ONCOLOGY | Facility: CLINIC | Age: 28
End: 2023-06-30

## 2023-06-30 NOTE — TELEPHONE ENCOUNTER
Appointment Change  Cancel, Reschedule, Change to Virtual      Who are you speaking with? Patient   If it is not the patient, are they listed on an active communication consent form? N/A   Which provider is the appointment scheduled with? Dr CACERES   When is the appointment scheduled? Please list date and time 7/31/23 8:00AM   At which location is the appointment scheduled to take place? [facility]   Was the appointment rescheduled or changed from an in person visit to a virtual visit? If so, please list the details of the change  8/30/23 8:00AM- patient requested the end of August as she states she needs to reschedule her US   What is the reason for the appointment change? Provider   Was [facility] transport scheduled for this visit? No   Does [facility]AR transport need to be scheduled for the new visit (if applicable) No   Does the patient need an infusion appointment rescheduled? No   Does the patient have an infusion appointment scheduled? If so, when? No   Is the patient undergoing chemotherapy? No   Was the no-show policy reviewed for appointments being changed with less then 24 hours of notice?  No

## 2023-08-25 ENCOUNTER — HOSPITAL ENCOUNTER (OUTPATIENT)
Dept: ULTRASOUND IMAGING | Facility: CLINIC | Age: 28
Discharge: HOME | End: 2023-08-25
Payer: COMMERCIAL

## 2023-08-25 DIAGNOSIS — N63.25 MASS OVERLAPPING MULTIPLE QUADRANTS OF LEFT BREAST: ICD-10-CM

## 2023-08-25 PROCEDURE — 76642 ULTRASOUND BREAST LIMITED: CPT

## 2023-08-30 ENCOUNTER — OFFICE VISIT (OUTPATIENT)
Dept: SURGICAL ONCOLOGY | Facility: CLINIC | Age: 28
End: 2023-08-30
Payer: COMMERCIAL

## 2023-08-30 VITALS
HEIGHT: 61 IN | OXYGEN SATURATION: 98 % | RESPIRATION RATE: 96 BRPM | WEIGHT: 145 LBS | DIASTOLIC BLOOD PRESSURE: 70 MMHG | TEMPERATURE: 96.8 F | SYSTOLIC BLOOD PRESSURE: 120 MMHG | BODY MASS INDEX: 27.38 KG/M2 | HEART RATE: 65 BPM

## 2023-08-30 DIAGNOSIS — Z15.09 BRCA2 GENE MUTATION POSITIVE: ICD-10-CM

## 2023-08-30 DIAGNOSIS — R92.8 ABNORMAL FINDING ON BREAST IMAGING: ICD-10-CM

## 2023-08-30 DIAGNOSIS — N63.25 MASS OVERLAPPING MULTIPLE QUADRANTS OF LEFT BREAST: Primary | ICD-10-CM

## 2023-08-30 DIAGNOSIS — Z12.39 BREAST CANCER SCREENING, HIGH RISK PATIENT: ICD-10-CM

## 2023-08-30 DIAGNOSIS — Z15.01 BRCA2 GENE MUTATION POSITIVE: ICD-10-CM

## 2023-08-30 DIAGNOSIS — Z90.13 S/P BILATERAL MASTECTOMY: ICD-10-CM

## 2023-08-30 PROCEDURE — 99213 OFFICE O/P EST LOW 20 MIN: CPT | Performed by: SURGERY

## 2023-08-30 NOTE — PROGRESS NOTES
Surgical Oncology Follow Up       [address] [facility] [place]  [address] [place] [address]    Latesha Shah  1995  [id number]  [address] [facility] [place]  [address] [address]    Chief Complaint   Patient presents with   • Follow-up          Assessment & Plan:   Patient presents for a 6-month follow-up visit. She recently had a ultrasound which demonstrated stable appearance of previously described left breast mass. She has had a bilateral mastectomy. Radiology recommended a mammogram and ultrasound though patient had a extensive discomfort with the mammogram but I do not see any need to follow-up with a mammogram since this seems to be a stable finding is best seen with ultrasound. See her back in 6 months. She is agreeable to see our nurse practitioner at that time. Cancer History:     Oncology History    No history exists. Interval History:   Patient appreciated a small nodule in her left breast previously. This has been followed and stable by ultrasound. I recommend a 6-month follow-up ultrasound and mammogram.    Review of Systems:   Review of Systems   All other systems reviewed and are negative.       Past Medical History     Patient Active Problem List   Diagnosis   • Irritant contact dermatitis due to plant   • Irritant contact dermatitis due to plants, except food   • 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   • Angio-edema   • Allergic asthma, mild intermittent, uncomplicated   • Allergic rhinitis caused by mold   • Seasonal allergic rhinitis due to pollen   • Allergic rhinitis due to animal (cat) (dog) hair and dander   • Acute atopic conjunctivitis, bilateral   • Intrinsic eczema   • PFAS (pollen-food allergy syndrome), initial encounter   • Latex allergy, contact dermatitis   • BRCA2 gene mutation positive   • Exercise-induced asthma   • Seborrhea capitis • S/P bilateral mastectomy   • Mass overlapping multiple quadrants of left breast     Past Medical History:   Diagnosis Date   • Anaphylaxis     tree nuts    • Asthma     " Exercise Induced "   • BRCA positive    • BRCA2 gene mutation positive 09/30/2019   • Cervical stenosis    • Cervical stenosis (uterine cervix)    • Dysmenorrhea    • GERD (gastroesophageal reflux disease)     pt states Heartburn only,  has had normal egd's   • Hives    • Inguinal mass     right   • Irritable bowel    • Menorrhagia    • Migraines    • Motion sickness    • Ovarian cyst August    Cyst formed post ovulation   • Pap smear for cervical cancer screening 05/31/2017    ASCUS, HPV negative   • PONV (postoperative nausea and vomiting)    • Vitamin D deficiency      Past Surgical History:   Procedure Laterality Date   • CAPSULOTOMY Bilateral 11/2/2022    Procedure: BREAST CAPSULECTOMY, IMPLANT EXCHANGE;  Surgeon: Kusum Sage MD;  Location: [address] [place] [address] MAIN OR;  Service: Plastics   • COLONOSCOPY     • CYST REMOVAL Right     Middle Finger   • EGD     • KNEE ARTHROSCOPY Right    • KNEE CARTILAGE SURGERY     • MASTECTOMY Bilateral     2020   • FL ADJACENT TISSUE TRANSFER/REARGMT TRUNK 10 SQCM/< Left 11/2/2022    Procedure: EXCISION BREAST MASS X2;  Surgeon: Kusum Sage MD;  Location: [address] [place] [address] MAIN OR;  Service: Plastics   • FL ADJNT TIS TRANSFR/REARRANGE TRUNK 10.1-30.0 SQCM Bilateral 01/30/2020    Procedure: TRUNK LOCAL FLAP;  Surgeon: Zeb Tapia MD;  Location: [facility] Main OR;  Service: Plastics   • FL GRAFTING OF AUTOLOGOUS FAT BY LIPO 50 CC OR LESS Bilateral 10/22/2020    Procedure: BREAST FAT GRAFTING;  Surgeon: Zeb Tapia MD;  Location: [facility] Main OR;  Service: Plastics   • FL IMPLNT BIO IMPLNT FOR SOFT TISSUE REINFORCEMENT Bilateral 01/30/2020    Procedure: BREAST RECONSTRUCTION WITH IMPLANT;  Surgeon: Zeb Tapia MD;  Location: [facility] Main OR;  Service: Plastics   • FL INSJ/RPLCMT BREAST IMPLANT SEP DAY MASTECTOMY Bilateral 06/18/2020    Procedure: BREAST CAPSULECTOMIES; EXPANDER/IMPLANT EXCHANGE;  Surgeon: Vicki Odom MD;  Location: [facility] Main OR;  Service: Plastics   • KS MASTECTOMY SIMPLE COMPLETE Bilateral 01/30/2020    Procedure: BREAST SIMPLE MASTECTOMY;  Surgeon: Saritha Emerson MD;  Location: [facility] Main OR;  Service: Surgical Oncology   • KS TISSUE EXPANDER PLACEMENT BREAST RECONSTRUCTION Bilateral 01/30/2020    Procedure: BREAST INSERTION/PLACEMENT TISSUE EXPANDER (EXCHANGE); Surgeon: Vicki Odom MD;  Location: [facility] OR;  Service: Plastics   • US GUIDED BREAST BIOPSY LEFT COMPLETE Left 12/16/2019   • US GUIDED BREAST BIOPSY RIGHT COMPLETE Right 12/16/2019   • WISDOM TOOTH EXTRACTION Bilateral      Family History   Problem Relation Age of Onset   • BRCA 1/2 Mother         Prophylactic surgery   • Anesthesia problems Mother    • Hypertension Father    • Heart disease Father    • Ovarian cancer Maternal Grandmother 43   • Brain cancer Maternal Grandmother    • No Known Problems Maternal Grandfather    • No Known Problems Paternal Grandmother    • No Known Problems Paternal Grandfather    • BRCA 1/2 Brother    • Allergies Brother    • BRCA 1/2 Maternal Uncle    • Melanoma Family    • No Known Problems Paternal Aunt    • No Known Problems Paternal Aunt      Social History     Socioeconomic History   • Marital status: Single     Spouse name: Not on file   • Number of children: 0   • Years of education: Not on file   • Highest education level: Not on file   Occupational History   • Occupation: Sales      Comment: [employer]    Tobacco Use   • Smoking status: Never   • Smokeless tobacco: Never   Vaping Use   • Vaping Use: Never used   Substance and Sexual Activity   • Alcohol use:  Yes     Alcohol/week: 6.0 standard drinks of alcohol     Types: 3 Glasses of wine, 3 Standard drinks or equivalent per week     Comment: Socially   • Drug use: No   • Sexual activity: Yes     Partners: Male     Birth control/protection: None     Comment: nuvaring   Other Topics Concern   • Not on file   Social History Narrative    Patient lives in a single dwelling home     [address] [facility] [address] in the bedroom     Home is smoke free     Uses air      No humidifier     No dehumidifier     No basement     Window air conditioning         2 dogs  Justo and Oswaldo Dakins and there not allowed in the bedroom         Caffeine:  Does not consume     Chocolate occasional         Patient lives with mom and dad      Social Determinants of Health     Financial Resource Strain: Not on file   Food Insecurity: Not on file   Transportation Needs: Not on file   Physical Activity: Sufficiently Active (3/21/2019)    Exercise Vital Sign    • Days of Exercise per Week: 7 days    • Minutes of Exercise per Session: 120 min   Stress: Not on file   Social Connections: Not on file   Intimate Partner Violence: Not on file   Housing Stability: Not on file       Current Outpatient Medications:   •  albuterol (PROVENTIL HFA,VENTOLIN HFA) 90 mcg/act inhaler, Inhale 2 puffs every 6 (six) hours as needed for wheezing, Disp: , Rfl:   •  EPINEPHrine (EPIPEN) 0.3 mg/0.3 mL SOAJ, Inject 0.3 mg into a muscle as needed for anaphylaxis, Disp: , Rfl: 1  •  Multiple Vitamins-Minerals (WOMENS MULTIVITAMIN PO), Take by mouth, Disp: , Rfl:   •  cephalexin (KEFLEX) 500 mg capsule, , Disp: , Rfl:   •  oxyCODONE-acetaminophen (PERCOCET) 5-325 mg per tablet, , Disp: , Rfl:   No current facility-administered medications for this visit.     Facility-Administered Medications Ordered in Other Visits:   •  bacitracin 50,000 Units in lactated ringers 1,000 mL irrigation bag, , Irrigation, Once, Mariah Reyes MD  •  lactated ringers infusion, 125 mL/hr, Intravenous, Continuous, Lorena Morejon MD, Stopped at 10/22/20 1706  •  lidocaine (PF) (XYLOCAINE-MPF) 1 % 50 mL, EPINEPHrine PF (ADRENALIN) 1 mL in lactated ringers 1,000 mL irrigation, , Irrigation, Once, Mariah Reyes MD  •  ondansetron [facility]) injection 4 mg, 4 mg, Intravenous, Q8H PRN, Radha Rivera MD  •  oxyCODONE-acetaminophen [facility]) 5-325 mg per tablet 2 tablet, 2 tablet, Oral, Q4H PRN, Mayelin Yee MD, 1 tablet at 10/22/20 3460  Allergies   Allergen Reactions   • Nuts - Food Allergy Anaphylaxis   • Peach [Prunus Persica] Anaphylaxis   • Latex Hives and Swelling     Localized reaction to Latex   • Nsaids Rash and Other (See Comments)   • Other Rash     Surgical glue   • Tolmetin Rash     " Nsaid"       Physical Exam:     Vitals:    08/30/23 0801   BP: 120/70   Pulse: 65   Resp: (!) 96   Temp: (!) 96.8 °F (36 °C)   SpO2: 98%     Physical Exam  Vitals reviewed. Constitutional:       Appearance: She is well-developed. HENT:      Head: Normocephalic and atraumatic. Eyes:      Pupils: Pupils are equal, round, and reactive to light. Neck:      Thyroid: No thyromegaly. Vascular: No JVD. Trachea: No tracheal deviation. Cardiovascular:      Rate and Rhythm: Normal rate and regular rhythm. Heart sounds: Normal heart sounds. No murmur heard. No friction rub. No gallop. Pulmonary:      Effort: Pulmonary effort is normal. No respiratory distress. Breath sounds: Normal breath sounds. No wheezing or rales. Chest:          Comments: Examination of the the right reconstructed breast demonstrates no worrisome skin findings masses or axillary adenopathy    Examination of the left breast demonstrates no worrisome skin findings there is no axillary adenopathy. Her mass is outlined on the diagram is stable clinically and well is on ultrasound. Abdominal:      General: There is no distension. Palpations: Abdomen is soft. There is no hepatomegaly or mass. Tenderness: There is no abdominal tenderness. There is no guarding or rebound. Musculoskeletal:         General: No tenderness. Normal range of motion. Cervical back: Normal range of motion and neck supple. Lymphadenopathy:      Cervical: No cervical adenopathy.    Skin:     General: Skin is warm and dry. Findings: No erythema or rash. Neurological:      Mental Status: She is alert and oriented to person, place, and time. Cranial Nerves: No cranial nerve deficit. Psychiatric:         Behavior: Behavior normal.           Results & Discussion:   Recommended the patient have an ultrasound. If the mammogram is absolutely necessary then they can contact us though it was very uncomfortable and I think it adds little information. Patient knows to contact us should she appreciate any changes in the clinical finding of this mass. We will see her back in 6 months. She is agreeable to see our nurse practitioner at that time        Advance Care Planning/Advance Directives:  I discussed the disease status, treatment plans and follow-up with the patient.

## 2024-01-02 ENCOUNTER — TELEPHONE (OUTPATIENT)
Dept: OBGYN CLINIC | Facility: MEDICAL CENTER | Age: 29
End: 2024-01-02

## 2024-01-02 ENCOUNTER — TELEPHONE (OUTPATIENT)
Dept: GYNECOLOGY | Facility: CLINIC | Age: 29
End: 2024-01-02

## 2024-01-02 DIAGNOSIS — N92.6 MISSED MENSES: Primary | ICD-10-CM

## 2024-01-02 NOTE — TELEPHONE ENCOUNTER
Patient called into office this morning to report positive pregnancy test, LMP was 12/6/2023. Patient advised to have labwork drawn at 6 weeks. Patient agreed to treatment plan and will call with any questions or concerns. Please add labs to chart, thank you!

## 2024-01-02 NOTE — TELEPHONE ENCOUNTER
Patient called stating she had several postitive pregnancy tests over the weekend.  Gave her [facility] phone number to make appt with them

## 2024-01-16 ENCOUNTER — LAB (OUTPATIENT)
Dept: LAB | Facility: CLINIC | Age: 29
End: 2024-01-16
Payer: COMMERCIAL

## 2024-01-16 DIAGNOSIS — N92.6 MISSED MENSES: ICD-10-CM

## 2024-01-16 LAB
ABO GROUP BLD: NORMAL
B-HCG SERPL-ACNC: ABNORMAL MIU/ML (ref 0–5)
BLD GP AB SCN SERPL QL: NEGATIVE
PROGEST SERPL-MCNC: 31.22 NG/ML
RH BLD: POSITIVE
SPECIMEN EXPIRATION DATE: NORMAL

## 2024-01-16 PROCEDURE — 86850 RBC ANTIBODY SCREEN: CPT

## 2024-01-16 PROCEDURE — 84144 ASSAY OF PROGESTERONE: CPT

## 2024-01-16 PROCEDURE — 86901 BLOOD TYPING SEROLOGIC RH(D): CPT

## 2024-01-16 PROCEDURE — 86900 BLOOD TYPING SEROLOGIC ABO: CPT

## 2024-01-16 PROCEDURE — 36415 COLL VENOUS BLD VENIPUNCTURE: CPT

## 2024-01-16 PROCEDURE — 84702 CHORIONIC GONADOTROPIN TEST: CPT

## 2024-01-16 NOTE — RESULT ENCOUNTER NOTE
Please call patient with results. Labs consistent with early pregnancy. 5w6d by LMP. Needs US for pregnancy dating/location. Can complete here or with radiology in 1-3wks.

## 2024-01-17 ENCOUNTER — TELEPHONE (OUTPATIENT)
Dept: OBGYN CLINIC | Facility: CLINIC | Age: 29
End: 2024-01-17

## (undated) DEVICE — 3M™ STERI-STRIP™ REINFORCED ADHESIVE SKIN CLOSURES, R1547, 1/2 IN X 4 IN (12 MM X 100 MM), 6 STRIPS/ENVELOPE: Brand: 3M™ STERI-STRIP™

## (undated) DEVICE — PENCIL SMOKE EVAC TELESCOPING W/TUBING

## (undated) DEVICE — BLANKET HYPOTHERMIA ADULT GAYMAR

## (undated) DEVICE — SUT ETHILON 3-0 PS-1 18 IN 1663G

## (undated) DEVICE — ELECTRODE BLADE MOD E-Z CLEAN  2.75IN 7CM -0012AM

## (undated) DEVICE — ADHESIVE SKIN HIGH VISCOSITY EXOFIN 1ML

## (undated) DEVICE — PLUMEPEN PRO 10FT

## (undated) DEVICE — KERLIX BANDAGE ROLL: Brand: KERLIX

## (undated) DEVICE — SUT MONOCRYL 4-0 PS-2 18 IN Y496G

## (undated) DEVICE — SUT VICRYL 3-0 SH 27 IN J416H

## (undated) DEVICE — SKIN MARKER DUAL TIP WITH RULER CAP, FLEXIBLE RULER AND LABELS: Brand: DEVON

## (undated) DEVICE — UNDYED MONOFILAMENT (POLYDIOXANONE), ABSORBABLE SURGICAL SUTURE: Brand: PDS

## (undated) DEVICE — SUT VICRYL 2-0 REEL 54 IN J286G

## (undated) DEVICE — CHEST/BREAST DRAPE: Brand: CONVERTORS

## (undated) DEVICE — PENCIL ELECTROSURG E-Z CLEAN -0035H

## (undated) DEVICE — CHLORAPREP HI-LITE 26ML ORANGE

## (undated) DEVICE — MEDI-VAC YANKAUER SUCTION HANDLE W/STRAIGHT TIP & CONTROL VENT: Brand: CARDINAL HEALTH

## (undated) DEVICE — SUT STRATAFIX SPIRAL 4-0 PGA/PCL 7 X 7 CM SXMD2B405

## (undated) DEVICE — VIOLET BRAIDED (POLYGLACTIN 910), SYNTHETIC ABSORBABLE SUTURE: Brand: COATED VICRYL

## (undated) DEVICE — MAYO STAND COVER: Brand: CONVERTORS

## (undated) DEVICE — STANDARD SURGICAL GOWN, L: Brand: CONVERTORS

## (undated) DEVICE — ELECTRODE BLADE MOD E-Z CLEAN 2.5IN 6.4CM -0012M

## (undated) DEVICE — SUT PROLENE 6-0 P-3 18 IN 8695G

## (undated) DEVICE — BETHLEHEM UNIVERSAL BREAST PK: Brand: CARDINAL HEALTH

## (undated) DEVICE — HIGH PROFILE BOOST, FOR USE WITH SHPB-775: Brand: MENTOR MEMORYGEL BOOST RESTERILIZABLE GEL SIZER

## (undated) DEVICE — JACKSON-PRATT 100CC BULB RESERVOIR: Brand: CARDINAL HEALTH

## (undated) DEVICE — INTENDED FOR TISSUE SEPARATION, AND OTHER PROCEDURES THAT REQUIRE A SHARP SURGICAL BLADE TO PUNCTURE OR CUT.: Brand: BARD-PARKER SAFETY BLADES SIZE 10, STERILE

## (undated) DEVICE — BASIC PACK: Brand: CONVERTORS

## (undated) DEVICE — ABDOMINAL PAD: Brand: DERMACEA

## (undated) DEVICE — LIGHT GLOVE GREEN

## (undated) DEVICE — SYRINGE 10ML LL CONTROL TOP

## (undated) DEVICE — UNDERGLOVE PROTEXIS  BLUE SZ 7

## (undated) DEVICE — SUT PDS II 2-0 CT-1 27 IN Z339H

## (undated) DEVICE — SAFETY PINS KIT: Brand: CARDINAL HEALTH

## (undated) DEVICE — 3M™ STERI-STRIP™ COMPOUND BENZOIN TINCTURE 40 BAGS/CARTON 4 CARTONS/CASE C1544: Brand: 3M™ STERI-STRIP™

## (undated) DEVICE — STERILE POLYISOPRENE POWDER-FREE SURGICAL GLOVES: Brand: PROTEXIS

## (undated) DEVICE — DRAPE SHEET THREE QUARTER

## (undated) DEVICE — TUBING SUCTION 5MM X 12 FT

## (undated) DEVICE — BULB SYRINGE,IRRIGATION WITH PROTECTIVE CAP: Brand: DOVER

## (undated) DEVICE — INTENDED FOR TISSUE SEPARATION, AND OTHER PROCEDURES THAT REQUIRE A SHARP SURGICAL BLADE TO PUNCTURE OR CUT.: Brand: BARD-PARKER ® CARBON RIB-BACK BLADES

## (undated) DEVICE — TUBING LIPOSUCTION ASPIRATION 12FT STERILE

## (undated) DEVICE — ELECTRODE BLADE MOD  E-Z CLEAN 6.5IN -0014M

## (undated) DEVICE — YANKAUER,OPEN TIP, NO VENT: Brand: ARGYLE

## (undated) DEVICE — INTENDED FOR TISSUE SEPARATION, AND OTHER PROCEDURES THAT REQUIRE A SHARP SURGICAL BLADE TO PUNCTURE OR CUT.: Brand: BARD-PARKER ® SAFETYLOCK CARBON RIB-BACK BLADES

## (undated) DEVICE — DRESSING XEROFORM 5 X 9

## (undated) DEVICE — FUNNEL E KELLER 2 DELIVERY DEVICE

## (undated) DEVICE — SPECIMEN CONTAINER STERILE PEEL PACK

## (undated) DEVICE — VIAL DECANTER

## (undated) DEVICE — GLOVE SRG BIOGEL 7.5

## (undated) DEVICE — 1820 FOAM BLOCK NEEDLE COUNTER: Brand: DEVON

## (undated) DEVICE — GLOVE INDICATOR PI UNDERGLOVE SZ 7.5 BLUE

## (undated) DEVICE — SUT VICRYL 2-0 SH 27 IN UNDYED J417H

## (undated) DEVICE — BETHLEHEM UNIVERSAL MINOR GEN: Brand: CARDINAL HEALTH

## (undated) DEVICE — IV CATH 14 G X 1.75

## (undated) DEVICE — LIGHT HANDLE COVER SLEEVE DISP BLUE STELLAR

## (undated) DEVICE — SUT STRATAFIX SPIRAL 3-0 PGA/PCL 30 X 30 CM SXMD2B408

## (undated) DEVICE — SYRINGE 30ML LL

## (undated) DEVICE — SPONGE LAP 18 X 18 IN STRL RFD

## (undated) DEVICE — NEEDLE HYPO 22G X 1-1/2 IN

## (undated) DEVICE — SUT MONOCRYL 3-0 PS-2 18 IN Y497G

## (undated) DEVICE — BETHLEHEM UNIVERSAL LAPAROTOMY: Brand: CARDINAL HEALTH

## (undated) DEVICE — SUT PDS II 2-0 CT-2 27 IN Z333H

## (undated) DEVICE — STERILE POLYISOPRENE POWDER-FREE SURGICAL GLOVES WITH EMOLLIENT COATING: Brand: PROTEXIS

## (undated) DEVICE — SMOKE EVACUATION TUBING WITH 8 IN INTEGRAL WAND AND SPONGE GUARD: Brand: BUFFALO FILTER

## (undated) DEVICE — UTILITY MARKER,BLACK WITH LABELS: Brand: DEVON

## (undated) DEVICE — DRAPE EQUIPMENT RF WAND

## (undated) DEVICE — PACK UNIVERSAL DRAPES SUB-Q ICD

## (undated) DEVICE — NEEDLE BLUNT 18 G X 1 1/2IN

## (undated) DEVICE — MINOR PROCEDURE DRAPE: Brand: CONVERTORS

## (undated) DEVICE — BAG DECANTER

## (undated) DEVICE — SYRINGE 50ML LL

## (undated) DEVICE — Device

## (undated) DEVICE — SYRINGE 10ML LL

## (undated) DEVICE — BOWL: 16OZ PEELPOUCH 75/CS 16/PLT: Brand: MEDEGEN MEDICAL PRODUCTS, LLC

## (undated) DEVICE — 3M™ TEGADERM™ TRANSPARENT FILM DRESSING FRAME STYLE, 1626W, 4 IN X 4-3/4 IN (10 CM X 12 CM), 50/CT 4CT/CASE: Brand: 3M™ TEGADERM™

## (undated) DEVICE — DRESSING BIOPATCH ANTIMICROBIAL 1 IN DISC

## (undated) DEVICE — SWABSTCK, BENZOIN TINCTURE, 1/PK, STRL: Brand: APLICARE

## (undated) DEVICE — NEEDLE 25G X 1 1/2

## (undated) DEVICE — ADHESIVE SKIN CLSR DERMABOND NX

## (undated) DEVICE — SINGLE PORT MANIFOLD: Brand: NEPTUNE 2

## (undated) DEVICE — DISPOSABLE OR TOWEL: Brand: CARDINAL HEALTH

## (undated) DEVICE — INTENDED FOR TISSUE SEPARATION, AND OTHER PROCEDURES THAT REQUIRE A SHARP SURGICAL BLADE TO PUNCTURE OR CUT.: Brand: BARD-PARKER SAFETY BLADES SIZE 15, STERILE

## (undated) DEVICE — JP CHAN DRN SIL HUBLESS 15FR W/TRO: Brand: CARDINAL HEALTH

## (undated) DEVICE — SUT SILK 2-0 SH 30 IN K833H

## (undated) DEVICE — PAD GROUNDING ADULT

## (undated) DEVICE — SCD SEQUENTIAL COMPRESSION COMFORT SLEEVE MEDIUM KNEE LENGTH: Brand: KENDALL SCD